# Patient Record
Sex: MALE | Race: WHITE
[De-identification: names, ages, dates, MRNs, and addresses within clinical notes are randomized per-mention and may not be internally consistent; named-entity substitution may affect disease eponyms.]

---

## 2019-09-16 ENCOUNTER — HOSPITAL ENCOUNTER (OUTPATIENT)
Dept: HOSPITAL 4 - SLB | Age: 69
Discharge: HOME | End: 2019-09-16
Payer: COMMERCIAL

## 2019-09-16 DIAGNOSIS — Z00.00: Primary | ICD-10-CM

## 2019-09-27 ENCOUNTER — HOSPITAL ENCOUNTER (INPATIENT)
Dept: HOSPITAL 36 - GERO | Age: 69
LOS: 19 days | Discharge: NURSING, REHAB OR LONG-TERM CARE | DRG: 885 | End: 2019-10-16
Attending: PSYCHIATRY & NEUROLOGY | Admitting: PSYCHIATRY & NEUROLOGY
Payer: MEDICARE

## 2019-09-27 VITALS — SYSTOLIC BLOOD PRESSURE: 138 MMHG | DIASTOLIC BLOOD PRESSURE: 57 MMHG

## 2019-09-27 DIAGNOSIS — F03.91: ICD-10-CM

## 2019-09-27 DIAGNOSIS — G47.00: ICD-10-CM

## 2019-09-27 DIAGNOSIS — Z66: ICD-10-CM

## 2019-09-27 DIAGNOSIS — Z86.73: ICD-10-CM

## 2019-09-27 DIAGNOSIS — F23: ICD-10-CM

## 2019-09-27 DIAGNOSIS — E78.5: ICD-10-CM

## 2019-09-27 DIAGNOSIS — F41.9: ICD-10-CM

## 2019-09-27 DIAGNOSIS — I25.10: ICD-10-CM

## 2019-09-27 DIAGNOSIS — F25.9: Primary | ICD-10-CM

## 2019-09-27 PROCEDURE — Z7610: HCPCS

## 2019-09-27 NOTE — HISTORY AND PHYSICAL
History of Present Illness





- HPI


Chief Complaint: 





68 y/o male patient was brought into ER on a 5150 Hold.


HPI: 





68 y/o male patient was brought into [facility] on a 5150 Hold.


Patient has history of TIA, CAD, Hyperlipidemia, Shizoaffective disorder, 

Bipolar disorder and Dementia.


Patient had an ER assessment and a complete workup was done. Patient was 

diagnosed with Dementia with behavioral disturbances, Schizoaffective disorder, 

H/o of CAD, H/o Stroke and H/o Hyperlipidemia.     


Patient will have a Psych evaluation. I will follow, treat and monitor patient.


Patient will continue current treatment plan a s ordered.


                                              








Vital Signs: 





 Last Vital Signs











Temp  96.8 F   09/27/19 15:31


 


Pulse  68   09/27/19 15:31


 


Resp  19   09/27/19 15:31


 


BP  138/57   09/27/19 15:31


 


Pulse Ox  96   09/27/19 15:31














Past Medical History


Cardiovascular: Report: CAD


Pulmonary: Report: No Pertinent Hx, Pulmonary Embolus


CNS: Report: Dementia


GI: Report: No Pertinent Hx


Psych: Report: Other (Schizoaffective disorder.)


Musculoskeletal: Report: No Pertinent Hx


Rheumatologic: Report: No pertinent Hx


Infectious Disease: Report: No Pertinent Hx


Renal/: Report: No Pertinent Hx





Family Medical History





- Family Member


  ** Son


History Unknown: Yes





Social History


Smoke: No


Alcohol: None


Drugs: None


Lives: Nursing Home


Domestic Violence: Negative





Health Maintenance


Health Maintenance: Other (Please see chart.)





- Medications


Home Medications: 


Home Medication











 Medication  Instructions  Recorded  Type


 


Acetaminophen [Tylenol] 650 mg PO Q4H PRN  tab 10/10/19 Rx


 


Al Hyd/Mg Hyd/Simethicone [Maalox] 30 ml PO Q4HR PRN  udc 10/10/19 Rx


 


Aspirin 325 mg PO DAILY  tab 10/10/19 Rx


 


Atorvastatin Calcium [Lipitor] 80 mg PO HS  tab 10/10/19 Rx


 


Clopidogrel [Plavix] 75 mg PO DAILY  tab 10/10/19 Rx


 


Divalproex Sprinkle [Depakote 250 mg PO DAILY  ecc 10/10/19 Rx





Sprinkle]   


 


Divalproex Sprinkle [Depakote 500 mg PO HS  ecc 10/10/19 Rx





Sprinkle]   


 


Docusate Sodium [Colace] 100 mg PO BID  cap 10/10/19 Rx


 


Lorazepam [Ativan] 0.5 mg PO Q4H PRN  tab 10/10/19 Rx


 


Magnesium Hydroxide [Milk of 30 ml PO HS PRN  udc 10/10/19 Rx





Magnesia]   


 


Menthol/Zinc Oxide [Calmoseptine] 1 appl TP QID PRN  appl 10/10/19 Rx


 


QUEtiapine Fumarate [SEROquel] 50 mg PO BID  tab 10/10/19 Rx


 


QUEtiapine Fumarate [SEROquel] 150 mg PO HS  tab 10/10/19 Rx


 


QUEtiapine Fumarate [SEROquel] 150 mg PO HS  tab 10/10/19 Rx


 


Zolpidem Tartrate [Ambien] 5 mg PO HS PRN  tab 10/10/19 Rx


 


Acetaminophen [Tylenol] 650 mg PO Q4H PRN  tab 10/16/19 Rx


 


Al Hyd/Mg Hyd/Simethicone [Maalox] 30 ml PO Q4HR PRN  udc 10/16/19 Rx


 


Aspirin 325 mg PO DAILY  tab 10/16/19 Rx


 


Atorvastatin Calcium [Lipitor] 80 mg PO DAILY  tab 10/16/19 Rx


 


Clopidogrel [Plavix] 75 mg PO DAILY  tab 10/16/19 Rx


 


Divalproex Sprinkle [Depakote 250 mg PO DAILY  ecc 10/16/19 Rx





Sprinkle]   


 


Divalproex Sprinkle [Depakote 500 mg PO HS  ecc 10/16/19 Rx





Sprinkle]   


 


Docusate Sodium [Colace] 100 mg PO BID  cap 10/16/19 Rx


 


Lorazepam [Ativan] 0.5 mg PO Q4HR PRN  tab 10/16/19 Rx


 


Magnesium Hydroxide [Milk of 30 ml PO HS PRN  udc 10/16/19 Rx





Magnesia]   


 


Menthol/Zinc Oxide [Calmoseptine] 1 appl TP QID PRN  appl 10/16/19 Rx


 


QUEtiapine Fumarate [SEROquel] 50 mg PO BID  tab 10/16/19 Rx


 


QUEtiapine Fumarate [SEROquel] 200 mg PO HS  tab 10/16/19 Rx


 


Zolpidem Tartrate [Ambien] 5 mg PO HS PRN  tab 10/16/19 Rx











Other Medications: 





Please see medication reconciliation sheet.





- Allergies


Allergies/Adverse Reactions: 


 Allergies











Allergy/AdvReac Type Severity Reaction Status Date / Time


 


No Known Allergies Allergy   Verified 08/31/19 17:24














Review of Systems





- Review of Systems


Review of Systems: 





patient is on 5150 hold due to hostile behavior towards staff.


Constitutional: Report: No Significant


Eyes: Report: No Significant


ENT: Report: No Significant


Respiratory: Report: No Significant


Cardiovascular: Report: No Significant


Gastrointestinal: Report: No Significant


Genitourinary: Report: No Significant


Musculoskeletal: Report: No Significant


Skin: Report: No Significant


Neurological: Report: Confusion





Physical Exam





- Physical Exam


HEENT: Report: Ears Nose Throat within normal limits


Neck: Report: Within normal limits


Cardiovascular Systems: Report: +s1/s2 noted


Respiratory: Report: Breath Sounds are within normal limits


Abdomen: Report: Non-tender to palpation


Back: Report: Inspection of back is within normal limits.


Extremities: Report: Non-tender to palpation.


Skin: Report: Color of skin is within normal limits


Neuro/Psych: Report: Disoriented to name time or place





- Lab Results


All Lab Results last 24 hours: 





please see orders.





- Assessment


Assessment: 


Dementia with behavioral disturbances.


Schizoaffective disorder.


History of Stroke.


History of Hyperlipidemia.   


History of TIA. 


History of CAD.








- Plan


Plan: 





Continuation of care.


Monitor vitals and labs.


Continue present meds as directed.


Monitor diet/nutritional support.


Supportive care.


Psych management per Psych.


Continue current treatment plan as ordered.

## 2019-09-29 NOTE — PROGRESS NOTES
DATE:  09/29/2019



SUBJECTIVE:  The patient was seen and evaluated.  The patient's chart reviewed.



Today on face-to-face evaluation, the patient is in Elizabeth chair.  He is extremely

uncomfortable, moving, restless, needs a lot of redirection to minimize to

behavior.  He is to have difficulty verbalizing what he is experiencing further

review of the chart.  I reviewed that the patient has a history of buttock

sacral ulcer.  I spoke with the patient's nurse the importance of followup today

with the medical evaluation to further assess the patient's sacral buttocks

ulcer as this make me contributing to the patient's behavior and irritability.



ASSESSMENT AND PLAN:  The patient has history of schizoaffective disorder,

dementia, was also medications from the nursing home reviewed.  The patient has

had been on 2 different antipsychotics which is unclear and recently had been

re-titrated to 75 mg p.o. b.i.d. of the Seroquel.  We will continue with the

current medication regimen and awaiting medical evaluation to continue further

assessing the patient's sacral ulcer.



Vitals, pulse and temperature within normal limits.





DD: 09/29/2019 07:21

DT: 09/29/2019 18:52

JOB# [id number]  [id number]

## 2019-09-29 NOTE — CONSULTATION
DATE OF CONSULTATION:  09/28/2019



The patient was seen and evaluated.  The patient's chart reviewed.



The patient was brought in here on a 5150.  He was selectively mute,

disoriented, needing redirection and easily agitated.



Today, on face-to-face evaluation, the patient is in the Elizabeth chair.  He is

observed to be responding, making hand gestures in regards to the voices, easily

irritable, agitated, needing a lot of redirection.



ALLERGIES TO MEDICATIONS:  NKDA.



PREVIOUS DIAGNOSES:  TIA, CAD, hyperlipidemia.



PREVIOUS PSYCHIATRIC HISTORY:  History of schizoaffective disorder, bipolar

type; dementia.



MEDICAL:  Please see H and P.



ESTIMATED LENGTH OF STAY:  Between 5-10 days.



MENTAL STATUS EXAMINATION:  Older age appearing, Elizabeth chair, disengaged,

confused, disorganized, and at times talking to himself.



SOCIAL HISTORY:  Not answering questions about social history.



FAMILY PSYCHIATRIC HISTORY:  Unclear.



MEDICATIONS:  Per records from [facility], medications reviewed, include

acetaminophen, aspirin, ____, Coreg, Depakote, Ativan as needed, Maalox,

Seroquel 100 mg a day, 200 at nighttime.



PROVISIONAL DIAGNOSTIC IMPRESSION:



PRIMARY DIAGNOSES:  Dementia with behavior disturbances, schizoaffective

disorder.



MEDICAL DIAGNOSIS:  As noted above.



PLAN:

1.  Admit the patient.

2.  I will obtain more collateral baseline information.

3.  Continue to monitor, supervise.

4.  Further collaborate on the patient's medication regimen reconciliation from

the reports, the patient's Seroquel 50 mg p.o. b.i.d.  There is an additional 25

mg, a total of 75 mg p.o. b.i.d. of the Seroquel, Depakote, ____, total Depakote

levels pending.  We will continue monitoring and evaluating.





DD: 09/28/2019 11:05

DT: 09/28/2019 20:27

JOB# [id number]  [id number]

## 2019-09-29 NOTE — INTERNAL MEDICINE PROG NOTE
Internal Medicine Subjective





- Subjective


Patient seen and examined:: with staff


Patient is:: awake, garfield chair, agitated, confused


Per staff patient has:: no adverse event, no episodes of fall, agitated, 

confused





Internal Medicine Objective





- Physical Exam


Vitals and I&O: 


 Vital Signs











Temp  97.8 F   09/29/19 06:01


 


Pulse  83   09/29/19 06:01


 


Resp  18   09/29/19 06:01


 


BP  115/57   09/29/19 06:01


 


Pulse Ox  96   09/29/19 06:01








 Intake & Output











 09/28/19 09/29/19 09/29/19





 18:59 06:59 18:59


 


Intake Total 1200 480 


 


Output Total  2 


 


Balance 1200 478 


 


Intake:   


 


  Oral 1200 480 


 


Output:   


 


  Urine/Stool Mix  2 


 


Other:   


 


  # Voids  1 


 


  # Bowel Movements 1 1 











Active Medications: 


Current Medications





Acetaminophen (Tylenol)  650 mg PO Q4H PRN


   PRN Reason: Mild Pain/Headache/T above 101


   Stop: 11/26/19 14:21


Al Hydrox/Mg Hydrox/Simethicone (Maalox)  30 ml PO Q4HR PRN


   PRN Reason: FPR HEARTBURN


   Stop: 11/27/19 09:09


Aspirin (Aspirin)  325 mg PO DAILY [facility]


   Stop: 11/27/19 08:59


   Last Admin: 09/29/19 08:44 Dose:  325 mg


Atorvastatin Calcium (Lipitor)  80 mg PO HS [facility]; Protocol


   Stop: 11/27/19 20:59


   Last Admin: 09/28/19 21:20 Dose:  80 mg


Clopidogrel Bisulfate (Plavix)  75 mg PO DAILY [facility]


   Stop: 11/27/19 08:59


   Last Admin: 09/29/19 08:44 Dose:  75 mg


Divalproex Sodium (Depakote Er)  250 mg PO HS [facility]; Protocol


   Stop: 11/27/19 20:59


   Last Admin: 09/28/19 21:20 Dose:  250 mg


Divalproex Sodium (Depakote Er)  250 mg PO QPM [facility]; Protocol


   Stop: 11/27/19 16:59


   Last Admin: 09/28/19 18:14 Dose:  250 mg


Docusate Sodium (Colace)  100 mg PO BID [facility]


   Stop: 11/27/19 16:59


   Last Admin: 09/29/19 08:44 Dose:  100 mg


Lorazepam (Ativan)  0.5 mg PO Q4H PRN; Protocol


   PRN Reason: Anxiety/Agitation


   Stop: 11/26/19 14:21


   Last Admin: 09/29/19 08:44 Dose:  0.5 mg


Magnesium Hydroxide (Milk Of Magnesia)  30 ml PO HS PRN


   PRN Reason: Constipation


   Stop: 11/26/19 14:21


Quetiapine Fumarate (Seroquel)  75 mg PO BID [facility]; Protocol


   Stop: 11/28/19 08:59


   Last Admin: 09/29/19 08:44 Dose:  75 mg


Zolpidem Tartrate (Ambien)  5 mg PO HS PRN


   PRN Reason: Insomnia


   Stop: 11/26/19 14:21


   Last Admin: 09/28/19 21:24 Dose:  5 mg








General: weak, alert, disheveled


HEENT: NC/AT


Neck: Supple, No JVD


Lungs: other (no acute respiratory distress)


Cardiovascular: RRR


Abdomen: soft, non-tender, non-distended


Extremities: ecchymosis


Neurological: unable to follow command (confused, demented)





Internal Medicine Assmt/Plan





- Assessment


Assessment: 





Dementia


Schizoaffective disorder


Hx CAD


Hx Stoke


Hyperlipidemia





- Plan


Plan: 





Continue current treatment plan.


Monitor Labs.Continue current medications


Continue to monitor VS


Monitor Diet/Nutritional support.


Psych management per Psychiatry.


Pain Management.


Wound Care consult - pending - ordered yesterday


PT/OT prn


Safety precaution, Fall precaution, frequent nursing round.


Supportive care. 


Continue collaborating with consulting specialists, case management and nursing 

team.

## 2019-09-30 NOTE — INTERNAL MEDICINE PROG NOTE
Internal Medicine Subjective





- Subjective


Service Date: 09/30/19


Patient seen and examined:: with staff


Patient is:: awake, garfield chair, agitated, confused


Patient Complaints of:: other (Anxiety.)


Per staff patient has:: no adverse event, no episodes of fall, agitated, 

confused





Internal Medicine Objective





- Physical Exam


Vitals and I&O: 


 Vital Signs











Temp  97.9 F   09/30/19 06:00


 


Pulse  81   09/30/19 06:00


 


Resp  19   09/30/19 06:00


 


BP  136/80   09/30/19 06:00


 


Pulse Ox  95   09/30/19 06:00








 Intake & Output











 09/29/19 09/30/19 09/30/19





 18:59 06:59 18:59


 


Intake Total 850 180 


 


Output Total  1 


 


Balance 850 179 


 


Intake:   


 


  Oral 850 180 


 


Output:   


 


  Urine/Stool Mix  1 


 


Other:   


 


  # Voids 3 1 


 


  # Bowel Movements 1 1 











Active Medications: 


Current Medications





Acetaminophen (Tylenol)  650 mg PO Q4H PRN


   PRN Reason: Mild Pain/Headache/T above 101


   Stop: 11/26/19 14:21


Al Hydrox/Mg Hydrox/Simethicone (Maalox)  30 ml PO Q4HR PRN


   PRN Reason: FPR HEARTBURN


   Stop: 11/27/19 09:09


Aspirin (Aspirin)  325 mg PO DAILY [facility]


   Stop: 11/27/19 08:59


   Last Admin: 09/29/19 08:44 Dose:  325 mg


Atorvastatin Calcium (Lipitor)  80 mg PO HS [facility]; Protocol


   Stop: 11/27/19 20:59


   Last Admin: 09/29/19 21:57 Dose:  80 mg


Clopidogrel Bisulfate (Plavix)  75 mg PO DAILY [facility]


   Stop: 11/27/19 08:59


   Last Admin: 09/29/19 08:44 Dose:  75 mg


Divalproex Sodium (Depakote Er)  250 mg PO HS [facility]; Protocol


   Stop: 11/27/19 20:59


   Last Admin: 09/29/19 21:57 Dose:  250 mg


Divalproex Sodium (Depakote Er)  250 mg PO QPM [facility]; Protocol


   Stop: 11/27/19 16:59


   Last Admin: 09/29/19 16:33 Dose:  250 mg


Docusate Sodium (Colace)  100 mg PO BID [facility]


   Stop: 11/27/19 16:59


   Last Admin: 09/29/19 16:32 Dose:  100 mg


Lorazepam (Ativan)  0.5 mg PO Q4H PRN; Protocol


   PRN Reason: Anxiety/Agitation


   Stop: 11/26/19 14:21


   Last Admin: 09/29/19 08:44 Dose:  0.5 mg


Magnesium Hydroxide (Milk Of Magnesia)  30 ml PO HS PRN


   PRN Reason: Constipation


   Stop: 11/26/19 14:21


Quetiapine Fumarate (Seroquel)  75 mg PO BID [facility]; Protocol


   Stop: 11/28/19 08:59


   Last Admin: 09/29/19 16:32 Dose:  75 mg


Zolpidem Tartrate (Ambien)  5 mg PO HS PRN


   PRN Reason: Insomnia


   Stop: 11/26/19 14:21


   Last Admin: 09/28/19 21:24 Dose:  5 mg








General: weak, alert, disheveled


HEENT: NC/AT


Neck: Supple, No JVD


Lungs: other (no acute respiratory distress)


Cardiovascular: RRR


Abdomen: soft, non-tender, non-distended


Extremities: ecchymosis


Neurological: no change, unable to follow command (confused, demented)





Internal Medicine Assmt/Plan





- Assessment


Assessment: 





Dementia.


Schizoaffective disorder.


H/o CAD.


H/o Stroke.


H/o Hyperlipidemia.





- Plan


Plan: 





Continuation of care.


Monitor labs and vitals.


Monitor diet/nutritional support.


Psych management per Psych.


Continue present meds as directed.


Continue current treatment plan as ordered.





Nutritional Asmnt/Malnutr-PDOC





- Dietary Evaluation


Malnutrition Findings (Please click <Entered> for more info): 





see orders

## 2019-09-30 NOTE — PROGRESS NOTES
DATE:  09/30/2019



SUBJECTIVE:  The patient in the hospital.  Currently in a Elizabeth chair, very

confused, disoriented, not really answering any questions, difficult interview. 

Noted to be restless, needing a lot of redirection, history of schizoaffective,

ulcers as well.  Medication adjustments have been made.  Very confused, AO to

essentially 1 only, preoccupied, irritable, disorganized.  Medications were

noted.  We will continue to monitor.





DD: 09/30/2019 11:45

DT: 09/30/2019 23:57

JOB# [id number]  [id number]

## 2019-10-01 ENCOUNTER — HOSPITAL ENCOUNTER (OUTPATIENT)
Dept: HOSPITAL 4 - SLB | Age: 69
Discharge: HOME | End: 2019-10-01
Payer: COMMERCIAL

## 2019-10-01 DIAGNOSIS — Z00.00: Primary | ICD-10-CM

## 2019-10-01 NOTE — INTERNAL MEDICINE PROG NOTE
Internal Medicine Subjective





- Subjective


Service Date: 10/01/19


Patient is:: awake, garfield chair, agitated, confused


Patient Complaints of:: other (Anxiety.)


Per staff patient has:: no adverse event, no episodes of fall, agitated, 

confused





Internal Medicine Objective





- Physical Exam


Vitals and I&O: 


 Vital Signs











Temp  97.7 F   10/01/19 14:00


 


Pulse  75   10/01/19 14:00


 


Resp  18   10/01/19 14:00


 


BP  115/67   10/01/19 14:00


 


Pulse Ox  97   10/01/19 14:00








 Intake & Output











 09/30/19 10/01/19 10/01/19





 18:59 06:59 18:59


 


Intake Total 800 120 


 


Output Total  1 


 


Balance 800 119 


 


Intake:   


 


  Oral 800 120 


 


Output:   


 


  Urine/Stool Mix  1 


 


Other:   


 


  # Voids 3 1 


 


  # Bowel Movements 2 1 


 


  Stool Characteristics Soft  





 Formed  





 Brown  











Active Medications: 


Current Medications





Acetaminophen (Tylenol)  650 mg PO Q4H PRN


   PRN Reason: Mild Pain/Headache/T above 101


   Stop: 11/26/19 14:21


Al Hydrox/Mg Hydrox/Simethicone (Maalox)  30 ml PO Q4HR PRN


   PRN Reason: FPR HEARTBURN


   Stop: 11/27/19 09:09


Aspirin (Aspirin)  325 mg PO DAILY Counts include 234 beds at the [facility]


   Stop: 11/27/19 08:59


   Last Admin: 10/01/19 09:23 Dose:  Not Given


Atorvastatin Calcium (Lipitor)  80 mg PO HS Counts include 234 beds at the [facility]; Protocol


   Stop: 11/27/19 20:59


   Last Admin: 09/30/19 22:00 Dose:  80 mg


Clopidogrel Bisulfate (Plavix)  75 mg PO DAILY Counts include 234 beds at the [facility]


   Stop: 11/27/19 08:59


   Last Admin: 10/01/19 09:23 Dose:  Not Given


Divalproex Sodium (Depakote Er)  250 mg PO HS Counts include 234 beds at the [facility]; Protocol


   Stop: 11/27/19 20:59


   Last Admin: 09/30/19 22:00 Dose:  250 mg


Divalproex Sodium (Depakote Er)  250 mg PO QPM Counts include 234 beds at the [facility]; Protocol


   Stop: 11/27/19 16:59


   Last Admin: 09/30/19 17:50 Dose:  250 mg


Docusate Sodium (Colace)  100 mg PO BID Counts include 234 beds at the [facility]


   Stop: 11/27/19 16:59


   Last Admin: 10/01/19 09:23 Dose:  Not Given


Lorazepam (Ativan)  0.5 mg PO Q4H PRN; Protocol


   PRN Reason: Anxiety/Agitation


   Stop: 11/26/19 14:21


   Last Admin: 10/01/19 13:55 Dose:  0.5 mg


Magnesium Hydroxide (Milk Of Magnesia)  30 ml PO HS PRN


   PRN Reason: Constipation


   Stop: 11/26/19 14:21


Quetiapine Fumarate 25 mg/ (Quetiapine Fumarate 50 mg)  75 mg PO BID [facility]


   Stop: 11/30/19 08:59


   Last Admin: 10/01/19 09:23 Dose:  Not Given


Zolpidem Tartrate (Ambien)  5 mg PO HS PRN


   PRN Reason: Insomnia


   Stop: 11/26/19 14:21


   Last Admin: 09/28/19 21:24 Dose:  5 mg








General: weak, alert, disheveled


HEENT: NC/AT


Neck: Supple, No JVD


Lungs: other (no acute respiratory distress)


Cardiovascular: RRR


Abdomen: soft, non-tender, non-distended


Extremities: ecchymosis


Neurological: no change, unable to follow command (confused, demented)





Internal Medicine Assmt/Plan





- Assessment


Assessment: 





Dementia.


Schizoaffective disorder.


H/o CAD.


H/o Stroke.


H/o Hyperlipidemia.








- Plan


Plan: 








Continuation of care.


Monitor labs and vitals.


Monitor diet/nutritional support.


Psych management per Psych.


Continue present meds as directed.


Continue current treatment plan as ordered.

## 2019-10-02 ENCOUNTER — HOSPITAL ENCOUNTER (OUTPATIENT)
Dept: HOSPITAL 4 - SLB | Age: 69
Discharge: HOME | End: 2019-10-02
Payer: COMMERCIAL

## 2019-10-02 DIAGNOSIS — Z00.00: Primary | ICD-10-CM

## 2019-10-02 LAB
ALBUMIN SERPL BCP-MCNC: 2.9 G/DL (ref 3.4–4.8)
ALT SERPL W P-5'-P-CCNC: 75 U/L (ref 12–78)
ANION GAP SERPL CALCULATED.3IONS-SCNC: 5 MMOL/L (ref 5–15)
AST SERPL W P-5'-P-CCNC: 49 U/L (ref 10–37)
BILIRUB SERPL-MCNC: 0.9 MG/DL (ref 0–1)
BUN SERPL-MCNC: 16 MG/DL (ref 8–21)
CALCIUM SERPL-MCNC: 8.4 MG/DL (ref 8.4–11)
CHLORIDE SERPL-SCNC: 103 MMOL/L (ref 98–107)
CREAT SERPL-MCNC: 0.94 MG/DL (ref 0.55–1.3)
GFR SERPL CREATININE-BSD FRML MDRD: 103 ML/MIN (ref 90–?)
GLUCOSE SERPL-MCNC: 101 MG/DL (ref 70–99)
POTASSIUM SERPL-SCNC: 3.7 MMOL/L (ref 3.5–5.1)
SODIUM SERPLBLD-SCNC: 138 MMOL/L (ref 136–145)

## 2019-10-02 NOTE — INTERNAL MEDICINE PROG NOTE
Internal Medicine Subjective





- Subjective


Service Date: 10/02/19


Patient seen and examined:: with staff


Patient is:: awake, garfield chair, agitated, confused


Patient Complaints of:: other (Anxiety.)


Per staff patient has:: no adverse event, no episodes of fall, agitated, 

confused





Internal Medicine Objective





- Physical Exam


Vitals and I&O: 


 Vital Signs











Temp  97.6 F   10/02/19 05:21


 


Pulse  60   10/02/19 05:21


 


Resp  19   10/02/19 05:21


 


BP  109/62   10/02/19 05:21


 


Pulse Ox  97   10/02/19 05:21








 Intake & Output











 10/01/19 10/02/19 10/02/19





 18:59 06:59 18:59


 


Intake Total 700 180 


 


Balance 700 180 


 


Intake:   


 


  Oral 700 180 


 


Other:   


 


  # Voids 3 1 


 


  # Bowel Movements 0 0 











Active Medications: 


Current Medications





Acetaminophen (Tylenol)  650 mg PO Q4H PRN


   PRN Reason: Mild Pain/Headache/T above 101


   Stop: 11/26/19 14:21


Al Hydrox/Mg Hydrox/Simethicone (Maalox)  30 ml PO Q4HR PRN


   PRN Reason: FPR HEARTBURN


   Stop: 11/27/19 09:09


Aspirin (Aspirin)  325 mg PO DAILY [facility]


   Stop: 11/27/19 08:59


   Last Admin: 10/02/19 08:17 Dose:  325 mg


Atorvastatin Calcium (Lipitor)  80 mg PO HS [facility]; Protocol


   Stop: 11/27/19 20:59


   Last Admin: 10/01/19 21:34 Dose:  80 mg


Clopidogrel Bisulfate (Plavix)  75 mg PO DAILY [facility]


   Stop: 11/27/19 08:59


   Last Admin: 10/02/19 08:17 Dose:  75 mg


Divalproex Sodium (Depakote Sprinkle)  250 mg PO HS [facility]; Protocol


   Stop: 12/01/19 20:59


Divalproex Sodium (Depakote Sprinkle)  250 mg PO QPM [facility]; Protocol


   Stop: 12/01/19 16:59


Docusate Sodium (Colace)  100 mg PO BID [facility]


   Stop: 11/27/19 16:59


   Last Admin: 10/02/19 08:17 Dose:  100 mg


Lorazepam (Ativan)  0.5 mg PO Q4H PRN; Protocol


   PRN Reason: Anxiety/Agitation


   Stop: 11/26/19 14:21


   Last Admin: 10/01/19 13:55 Dose:  0.5 mg


Magnesium Hydroxide (Milk Of Magnesia)  30 ml PO HS PRN


   PRN Reason: Constipation


   Stop: 11/26/19 14:21


Quetiapine Fumarate 25 mg/ (Quetiapine Fumarate 50 mg)  75 mg PO BID [facility]


   Stop: 11/30/19 08:59


   Last Admin: 10/02/19 08:17 Dose:  75 mg


Zolpidem Tartrate (Ambien)  5 mg PO HS PRN


   PRN Reason: Insomnia


   Stop: 11/26/19 14:21


   Last Admin: 09/28/19 21:24 Dose:  5 mg








Physical Exam: 





Patient is easily agitated and very confused.


General: weak, alert, disheveled


HEENT: NC/AT


Neck: Supple, No JVD


Lungs: other (no acute respiratory distress)


Cardiovascular: RRR


Abdomen: soft, non-tender, non-distended


Extremities: ecchymosis


Neurological: no change, unable to follow command (confused, demented)





Internal Medicine Assmt/Plan





- Assessment


Assessment: 


Dementia.


Schizoaffective disorder.


H/o CAD.


H/o Stroke.


H/o Hyperlipidemia.





- Plan


Plan: 


Continuation of care.


Monitor labs and vitals.


Monitor diet/nutritional support.


Psych management per Psych.


Continue present meds as directed.


Continue current treatment plan as ordered.





Nutritional Asmnt/Malnutr-PDOC





- Dietary Evaluation


Malnutrition Findings (Please click <Entered> for more info): 





see orders.

## 2019-10-02 NOTE — PROGRESS NOTES
DATE:  10/01/2019



SUBJECTIVE:  The patient is still confused and disoriented.  The patient also is

still having difficulty with his mood and difficulty with expressing himself. 

The patient also is still restless and has mood swings.  Otherwise, the patient

is compliant with medications with no side effects.



ASSESSMENT:  The patient is still confused and irritable with mood swings.



TREATMENT PLAN:  We will continue monitoring his behavior closely.  Also, we

will continue Seroquel and Depakote and we will get Depakote blood level today

and continue to follow up.





DD: 10/01/2019 18:58

DT: 10/02/2019 16:37

JOB# [id number]  [id number]

## 2019-10-02 NOTE — INTERNAL MEDICINE PROG NOTE
Internal Medicine Subjective





- Subjective


Service Date: 10/02/19


Patient seen and examined:: with staff


Patient is:: awake, garfield chair, agitated, confused


Patient Complaints of:: other (Anxiety.)


Per staff patient has:: no adverse event, no episodes of fall, agitated, 

confused





Internal Medicine Objective





- Physical Exam


Vitals and I&O: 


 Vital Signs











Temp  97.6 F   10/02/19 05:21


 


Pulse  60   10/02/19 05:21


 


Resp  19   10/02/19 05:21


 


BP  109/62   10/02/19 05:21


 


Pulse Ox  97   10/02/19 05:21








 Intake & Output











 10/01/19 10/02/19 10/02/19





 18:59 06:59 18:59


 


Intake Total 700 180 


 


Balance 700 180 


 


Intake:   


 


  Oral 700 180 


 


Other:   


 


  # Voids 3 1 


 


  # Bowel Movements 0 0 











Active Medications: 


Current Medications





Acetaminophen (Tylenol)  650 mg PO Q4H PRN


   PRN Reason: Mild Pain/Headache/T above 101


   Stop: 11/26/19 14:21


Al Hydrox/Mg Hydrox/Simethicone (Maalox)  30 ml PO Q4HR PRN


   PRN Reason: FPR HEARTBURN


   Stop: 11/27/19 09:09


Aspirin (Aspirin)  325 mg PO DAILY [facility]


   Stop: 11/27/19 08:59


   Last Admin: 10/02/19 08:17 Dose:  325 mg


Atorvastatin Calcium (Lipitor)  80 mg PO HS [facility]; Protocol


   Stop: 11/27/19 20:59


   Last Admin: 10/01/19 21:34 Dose:  80 mg


Clopidogrel Bisulfate (Plavix)  75 mg PO DAILY [facility]


   Stop: 11/27/19 08:59


   Last Admin: 10/02/19 08:17 Dose:  75 mg


Divalproex Sodium (Depakote Sprinkle)  250 mg PO HS [facility]; Protocol


   Stop: 12/01/19 20:59


Divalproex Sodium (Depakote Sprinkle)  250 mg PO QPM [facility]; Protocol


   Stop: 12/01/19 16:59


Docusate Sodium (Colace)  100 mg PO BID [facility]


   Stop: 11/27/19 16:59


   Last Admin: 10/02/19 08:17 Dose:  100 mg


Lorazepam (Ativan)  0.5 mg PO Q4H PRN; Protocol


   PRN Reason: Anxiety/Agitation


   Stop: 11/26/19 14:21


   Last Admin: 10/01/19 13:55 Dose:  0.5 mg


Magnesium Hydroxide (Milk Of Magnesia)  30 ml PO HS PRN


   PRN Reason: Constipation


   Stop: 11/26/19 14:21


Quetiapine Fumarate 25 mg/ (Quetiapine Fumarate 50 mg)  75 mg PO BID [facility]


   Stop: 11/30/19 08:59


   Last Admin: 10/02/19 08:17 Dose:  75 mg


Zolpidem Tartrate (Ambien)  5 mg PO HS PRN


   PRN Reason: Insomnia


   Stop: 11/26/19 14:21


   Last Admin: 09/28/19 21:24 Dose:  5 mg








Physical Exam: 


Patient is still very confused and in irritable mood.


General: weak, alert, disheveled


HEENT: NC/AT


Neck: Supple, No JVD


Lungs: other (no acute respiratory distress)


Cardiovascular: RRR


Abdomen: soft, non-tender, non-distended


Extremities: ecchymosis


Neurological: no change, unable to follow command (confused, demented)





Internal Medicine Assmt/Plan





- Assessment


Assessment: 





Dementia.


Schizoaffective disorder.


H/o CAD.


H/o Stroke.


H/o Hyperlipidemia.





- Plan


Plan: 





Continuation of care.


Monitor labs and vitals.


Monitor diet/nutritional support.


Psych management per Psych.


Continue present meds as directed.


Continue current treatment plan as ordered.





Nutritional Asmnt/Malnutr-PDOC





- Dietary Evaluation


Malnutrition Findings (Please click <Entered> for more info): 





see orders.

## 2019-10-03 NOTE — INTERNAL MEDICINE PROG NOTE
Internal Medicine Subjective





- Subjective


Service Date: 10/03/19


Patient is:: awake, garfield chair, agitated, confused


Patient Complaints of:: other (Anxiety.)


Per staff patient has:: no adverse event, no episodes of fall, agitated, 

confused





Internal Medicine Objective





- Physical Exam


Vitals and I&O: 


 Vital Signs











Temp  97.2 F   10/02/19 20:45


 


Pulse  74   10/02/19 20:45


 


Resp  20   10/02/19 20:45


 


BP  102/70   10/02/19 20:45


 


Pulse Ox  97   10/02/19 20:45








 Intake & Output











 10/02/19 10/03/19 10/03/19





 18:59 06:59 18:59


 


Intake Total 800 120 


 


Balance 800 120 


 


Intake:   


 


  Oral 800 120 


 


Other:   


 


  # Voids 3 3 


 


  # Bowel Movements 1  











Active Medications: 


Current Medications





Acetaminophen (Tylenol)  650 mg PO Q4H PRN


   PRN Reason: Mild Pain/Headache/T above 101


   Stop: 11/26/19 14:21


   Last Admin: 10/02/19 20:17 Dose:  650 mg


Al Hydrox/Mg Hydrox/Simethicone (Maalox)  30 ml PO Q4HR PRN


   PRN Reason: FPR HEARTBURN


   Stop: 11/27/19 09:09


Aspirin (Aspirin)  325 mg PO DAILY [facility]


   Stop: 11/27/19 08:59


   Last Admin: 10/03/19 08:13 Dose:  325 mg


Atorvastatin Calcium (Lipitor)  80 mg PO HS [facility]; Protocol


   Stop: 11/27/19 20:59


   Last Admin: 10/02/19 20:16 Dose:  80 mg


Calamine/Phenol (Calmoseptine)  1 appl TP QID PRN


   PRN Reason: Skin Irritation


   Stop: 12/01/19 17:06


Clopidogrel Bisulfate (Plavix)  75 mg PO DAILY [facility]


   Stop: 11/27/19 08:59


   Last Admin: 10/03/19 08:13 Dose:  75 mg


Divalproex Sodium (Depakote Sprinkle)  250 mg PO HS [facility]; Protocol


   Stop: 12/01/19 20:59


   Last Admin: 10/02/19 20:16 Dose:  250 mg


Divalproex Sodium (Depakote Sprinkle)  250 mg PO QPM [facility]; Protocol


   Stop: 12/01/19 16:59


   Last Admin: 10/02/19 16:33 Dose:  250 mg


Docusate Sodium (Colace)  100 mg PO BID [facility]


   Stop: 11/27/19 16:59


   Last Admin: 10/03/19 08:13 Dose:  100 mg


Lorazepam (Ativan)  0.5 mg PO Q4H PRN; Protocol


   PRN Reason: Anxiety/Agitation


   Stop: 11/26/19 14:21


   Last Admin: 10/03/19 08:13 Dose:  0.5 mg


Magnesium Hydroxide (Milk Of Magnesia)  30 ml PO HS PRN


   PRN Reason: Constipation


   Stop: 11/26/19 14:21


Quetiapine Fumarate 25 mg/ (Quetiapine Fumarate 50 mg)  75 mg PO BID [facility]


   Stop: 11/30/19 08:59


   Last Admin: 10/03/19 08:13 Dose:  75 mg


Zolpidem Tartrate (Ambien)  5 mg PO HS PRN


   PRN Reason: Insomnia


   Stop: 11/26/19 14:21


   Last Admin: 09/28/19 21:24 Dose:  5 mg








General: weak, alert, disheveled


HEENT: NC/AT


Neck: Supple, No JVD


Lungs: other (no acute respiratory distress)


Cardiovascular: RRR


Abdomen: soft, non-tender, non-distended


Extremities: ecchymosis


Neurological: no change, unable to follow command (confused, demented)





Internal Medicine Assmt/Plan





- Assessment


Assessment: 





Dementia.


Schizoaffective disorder.


H/o CAD.


H/o Stroke.


H/o Hyperlipidemia.








- Plan


Plan: 








Continuation of care.


Monitor labs and vitals.


Monitor diet/nutritional support.


Psych management per Psych.


Continue present meds as directed.


Continue current treatment plan as ordered.

## 2019-10-03 NOTE — PROGRESS NOTES
DATE:  10/02/2019



SUBJECTIVE:  Chart reviewed and the patient interviewed.  Also discussed the

patient's condition with the staff and reviewed records and labs.  The patient

continued to be confused and still has disorganized thoughts.  The patient also

is preoccupied and is easily irritable and easily agitated especially when

talking to him ____.  Also unable to carry on any coherent conversation ____,

otherwise the patient is continued to be compliant with taking his medication

with no side effects from the medications.



ASSESSMENT:  The patient is still confused and considered to be gravely

disabled.



TREATMENT PLAN:  Continue Seroquel and Depakote at same dose.  The patient has

been having difficulty swallowing Depakote extended release and changed it to

Depakote Sprinkles for easier to swallow.  Also, Depakote blood level was

ordered and results are pending.  Also, questionable if the patient can return

to previous placement.





DD: 10/02/2019 22:24

DT: 10/03/2019 15:44

JOB# [id number]  [id number]

## 2019-10-04 RX ADMIN — ANORECTAL OINTMENT PRN APPL: 15.7; .44; 24; 20.6 OINTMENT TOPICAL at 00:43

## 2019-10-04 NOTE — INTERNAL MEDICINE PROG NOTE
Internal Medicine Subjective





- Subjective


Service Date: 10/04/19


Patient seen and examined:: with staff


Patient is:: awake, garfield chair, agitated, confused


Patient Complaints of:: other (Anxiety.)


Per staff patient has:: no adverse event, no episodes of fall, agitated, 

confused





Internal Medicine Objective





- Physical Exam


Vitals and I&O: 


 Vital Signs











Temp  97.7 F   10/03/19 20:54


 


Pulse  98   10/03/19 20:54


 


Resp  20   10/03/19 20:54


 


BP  100/72   10/03/19 20:54


 


Pulse Ox  95   10/03/19 20:54








 Intake & Output











 10/03/19 10/04/19 10/04/19





 18:59 06:59 18:59


 


Intake Total 1300  


 


Balance 1300  


 


Intake:   


 


  Oral 1300  


 


Other:   


 


  # Voids 4  


 


  # Bowel Movements 0  











Active Medications: 


Current Medications





Acetaminophen (Tylenol)  650 mg PO Q4H PRN


   PRN Reason: Mild Pain/Headache/T above 101


   Stop: 19 14:21


   Last Admin: 10/02/19 20:17 Dose:  650 mg


Al Hydrox/Mg Hydrox/Simethicone (Maalox)  30 ml PO Q4HR PRN


   PRN Reason: FPR HEARTBURN


   Stop: 19 09:09


Aspirin (Aspirin)  325 mg PO DAILY [facility]


   Stop: 19 08:59


   Last Admin: 10/04/19 08:40 Dose:  325 mg


Atorvastatin Calcium (Lipitor)  80 mg PO HS [facility]; Protocol


   Stop: 19 20:59


   Last Admin: 10/03/19 20:04 Dose:  80 mg


Calamine/Phenol (Calmoseptine)  1 appl TP QID PRN


   PRN Reason: Skin Irritation


   Stop: 19 17:06


   Last Admin: 10/04/19 00:43 Dose:  1 appl


Clopidogrel Bisulfate (Plavix)  75 mg PO DAILY [facility]


   Stop: 19 08:59


   Last Admin: 10/04/19 08:39 Dose:  75 mg


Divalproex Sodium (Depakote Sprinkle)  250 mg PO HS [facility]; Protocol


   Stop: 19 20:59


   Last Admin: 10/03/19 20:04 Dose:  250 mg


Divalproex Sodium (Depakote Sprinkle)  250 mg PO QPM [facility]; Protocol


   Stop: 19 16:59


   Last Admin: 10/03/19 17:19 Dose:  250 mg


Docusate Sodium (Colace)  100 mg PO BID [facility]


   Stop: 19 16:59


   Last Admin: 10/04/19 08:40 Dose:  100 mg


Lorazepam (Ativan)  0.5 mg PO Q4H PRN; Protocol


   PRN Reason: Anxiety/Agitation


   Stop: 19 14:21


   Last Admin: 10/04/19 05:32 Dose:  0.5 mg


Magnesium Hydroxide (Milk Of Magnesia)  30 ml PO HS PRN


   PRN Reason: Constipation


   Stop: 19 14:21


Quetiapine Fumarate 25 mg/ (Quetiapine Fumarate 50 mg)  75 mg PO BID [facility]


   Stop: 19 08:59


   Last Admin: 10/04/19 08:40 Dose:  75 mg


Zolpidem Tartrate (Ambien)  5 mg PO HS PRN


   PRN Reason: Insomnia


   Stop: 19 14:21


   Last Admin: 10/03/19 20:05 Dose:  5 mg








Physical Exam: 


Patient is gravely disabled and remains very confused.


General: weak, alert, disheveled


HEENT: NC/AT


Neck: Supple, No JVD


Lungs: other (no acute respiratory distress)


Cardiovascular: RRR


Abdomen: soft, non-tender, non-distended


Extremities: ecchymosis


Neurological: no change, unable to follow command (confused, demented)





Internal Medicine Assmt/Plan





- Assessment


Assessment: 


Dementia.


Schizoaffective disorder.


H/o CAD.


H/o Stroke.


H/o Hyperlipidemia.





- Plan


Plan: 


Continuation of care.


Monitor labs and vitals.


Monitor diet/nutritional support.


Psych management per Psych.


Continue present meds as directed.


Continue current treatment plan as ordered.





Nutritional Asmnt/Malnutr-PDOC





- Dietary Evaluation


Malnutrition Findings (Please click <Entered> for more info): 








Nutritional Asmnt/Malnutrition                             Start:  10/03/19 15:

45


Text:                                                      Status: Complete    

  


Freq:                                                                          

  


Protocol:                                                                      

  


 Document     10/03/19 15:55  CARLIN  (Rec: 10/03/19 16:00  CARLIN [id number])


 Nutritional Asmnt/Malnutrition


     Patient General Information


      Nutritional Screening                      Low Risk


      Diagnosis                                  Psychosis


      Pertinent Medical Hx/Surgical Hx           TIA, CAD, Hyperlipidemia,


                                                 Schizoaffective Disorder,


                                                 Bipolar Type, Dementia


      Subjective Information                     Pt is a 69-year-old male


                                                 admitted on  on a 5150. Pt


                                                 is eating % of meals


                                                 Per Meal/Nutrition Activity


                                                 Record. Dietary is currently


                                                 providing an estimated 2370


                                                 kcals and 107gm Pro, per Pt


                                                 intake, this is providing an


                                                 estimated 2070 kcals and 94gm


                                                 Pro to meet 100% kcal and 100+


                                                 % Pro needs- adequate to meet


                                                 estimated nutritional needs.


                                                 Per wound care note (10/2), 


                                                 Right Lower Sacral and Buttock


                                                 area: Reopened scar tissue


                                                 from a wound of prior unknown


                                                 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


                                                 MASD. Open sites have 100% red


                                                 tissue. No odor, no drainage.


                                                 Yuli-wound intact.


                                                 Surrounding tissue has dark


                                                 discolored flaky skin and scar


                                                 tissue. Moisture barrier


                                                 cream nearly covered all of


                                                 the non-intact areas. Entire


                                                 site measures 13.0 cm x 15.0


                                                 cm (includes dark discolored


                                                 flaky skin, with a few


                                                 sporadic non-intact sites


                                                 within the dark discolored


                                                 flaky skin). Spoke with pt


                                                 Nurse, Colette, concerning wound


                                                 . Suggested either a


                                                 multivitamin or Prakash to


                                                 support wound healing, Colette


                                                 stated he would speak with MD


                                                 concerning a Multivitamin as


                                                 he did not believe the pt


                                                 would drink the Prakash.


                                                 HT: 57


                                                 WT: 155 LB (70.45 kg)


                                                 BMI: 24.28 (Normal)


                                                 GI: WNL, Soft, Non-tender


                                                 BM: 10/3 x1


                                                 I/O: 920/Not Noted


                                                 Skin: Area of Concern, ALIX Leg


                                                 abrasions with reddening, LT


                                                 hand bruise, Sacral/Buttock


                                                 open wound


                                                 Philip: 16


                                                 Diet Order: Pureed, ROBIN


                                                 Estimated Energy Needs: (


                                                 Geriatric, CBW)


                                                 9749-7132 kcals (25-30 kcals/


                                                 kg)


                                                 70-85g Pro (1.0-1.2 g/kg)


                                                 7111-8129 ml (25-30 ml/kg)


      Current Diet Order/ Nutrition Support      Pureed, ROBIN


      Pertinent Medications                      Maalox (PRN), Lipitor, Colace,


                                                 MOM (PRN)


      Pertinent Labs                             No Labs To Report.


     Nutritional Hx/Data


      Height                                     1.7 m


      Height (Calculated Centimeters)            170.2


      Current Weight (lbs)                       70.307 kg


      Weight (Calculated Kilograms)              70.3


      Weight (Calculated Grams)                  82732.8


      Ideal Body Weight                          148 LB(67.27 kg)


      % Ideal Body Weight                        105


      Body Mass Index (BMI)                      24.3


      Weight Status                              Approriate


     GI Symptoms


      GI Symptoms                                None


      Last BM                                    10/3 x1


      Skin Integrity/Comment:                    Skin: Area of Concern, ALIX Leg


                                                 abrasions with reddening, LT


                                                 hand bruise, Sacral/Buttock


                                                 open wound


                                                 Philip: 16


      Current %PO                                Good (%)


     Estimated Nutritional Goals


      BEE in Kcals:                              Using Current wt


      Calories/Kcals/Kg                          25-30


      Kcals Calculated                           5860-9223


      Protein:                                   Using Current wt


      Protein g/k.0-1.2


      Protein Calculated                         70-85


      Fluid: ml                                  3855-8996 ml (25-30 ml/kg)


     Nutritional Problem


      1. Problem


       Problem                                   Increased vitamin/mineral


                                                 utilization


       Etiology                                  r/t wound healing


       Signs/Symptoms:                           aeb ALIX Leg abrasions with


                                                 reddening, LT hand bruise,


                                                 Sacral/Buttock open wound.


     Intervention/Recommendation


      Comments                                   1. Continue with Pureed, ROBIN


                                                 diet as ordered.


                                                 2. Recommend addition of


                                                 multivitamin to support wound


                                                 healing and skin integrity.


     Expected Outcomes/Goals


      Expected Outcomes/Goals                    1. PO intake to continue to


                                                 meet >75% of nutritional needs


                                                 .


                                                 2. Monitor PO intake, wt,


                                                 nutrition related labs, and


                                                 skin integrity to trend WNL.


                                                 3. F/U as moderate risk in 3-5


                                                 days, 10/6-10/8.

## 2019-10-05 NOTE — PROGRESS NOTES
DATE:  10/03/2019



SUBJECTIVE:  Chart reviewed and the patient interviewed.  Also discussed the

patient's condition with the staff and reviewed records and labs.  The patient

is still confused and he still has disorganized thoughts and preoccupied.  The

patient also is still restless and is still severely confused and resisting

care.  The patient also is to have wound in the sacral area and staff has been

turning the patient's more to avoid any of further wounds and also avoiding bed

sores.  Otherwise, the patient is compliant with taking his medications with no

side effects of medications.



ASSESSMENT:  The patient is still confused and agitated.



TREATMENT PLAN:  Continue to monitor behavior and condition closely.  Depakote

blood level that was done on 10/01/2019 came back to be 18, which is

subtherapeutic, but the patient was not taking Depakote regularly before.  We

will continue same dose and continue adjusting medications and followup.





DD: 10/04/2019 18:44

DT: 10/05/2019 01:54

JOB# [id number]  [id number]

## 2019-10-05 NOTE — PROGRESS NOTES
DATE:  10/04/2019



SUBJECTIVE:  Chart reviewed and the patient interviewed.  Also discussed the

patient's condition with the staff and reviewed records and labs.  The patient

is still confused and in angry and irritable mood.  The patient also is

restless.  Today during interview, the patient was trying to take off part of

his clothes.  Still have difficulty redirecting him because of confusion and

because of being disoriented and irritable.  Otherwise, the patient continued to

comply with taking his medications with no side effects of Seroquel or Depakote.



ASSESSMENT:  The patient is still agitated and confused.



TREATMENT PLAN:  Continue to monitor behavior and condition closely.  Also,

continue adjusting psychotropic medications and work on behavioral modification.





DD: 10/04/2019 18:56

DT: 10/05/2019 02:15

[facility]# [id number]  [id number]

## 2019-10-05 NOTE — PROGRESS NOTES
DATE:  10/05/2019



SUBJECTIVE:  The patient was seen in the dining area, currently in a Elizabeth chair.

 Per nurse report, the patient has poor sleep last night, even though oral as

needed medication to help him sleep were given.  Today, the patient appears to

be drowsy, but a poor historian, episodes of agitation, easily gets frustrated

with behavioral outburst.  The patient needs a lot of redirection.  Otherwise,

the patient is in no acute distress.



OBJECTIVE:

VITAL SIGNS:  Temperature 97.6, heart rate 78, blood pressure 111/59,

respirations 19, and 96% on room air.

HEENT:  Head is atraumatic and normocephalic.  Eyes:  Bilateral conjunctivae are

clear.  Bilateral pupils are equally round and reactive.

NECK:  Supple.  No JVD.

CARDIOVASCULAR:  S1 and S2, without murmur.

PULMONARY:  Clear to auscultation.

GASTROINTESTINAL:  Soft and nontender without guarding.  Positive bowel sounds.

MUSCULOSKELETAL:  No clubbing.  No cyanosis noted.



ASSESSMENT:

1.  Dementia.

2.  Hyperlipidemia.

3.  Coronary artery disease.



PLAN:  We will keep the patient inpatient to Psychiatric Unit.  We will follow

up with the psychiatrist to monitor the patient's condition, behavior and

progress.  We will put the patient on fall and aspiration precaution.  Treatment

plans were discussed with the patient's nurse.  Treatment plans were discussed

with Dr. Rogel.





DD: 10/05/2019 06:22

DT: 10/05/2019 07:33

JOB# [id number]  [id number]

## 2019-10-06 NOTE — INTERNAL MEDICINE PROG NOTE
Internal Medicine Subjective





- Subjective


Patient is:: awake, asleep, arousable, garfield chair, agitated, confused


Patient Complaints of:: other (Anxiety.)


Per staff patient has:: no adverse event, no episodes of fall, agitated, 

confused





Internal Medicine Objective





- Physical Exam


Vitals and I&O: 


 Vital Signs











Temp  98.4 F   10/06/19 14:00


 


Pulse  76   10/06/19 14:00


 


Resp  20   10/06/19 14:00


 


BP  118/64   10/06/19 14:00


 


Pulse Ox  98   10/06/19 14:00








 Intake & Output











 10/05/19 10/06/19 10/06/19





 18:59 06:59 18:59


 


Intake Total 1200 120 


 


Balance 1200 120 


 


Intake:   


 


  Oral 1200 120 


 


Other:   


 


  # Voids  3 


 


  # Bowel Movements 1  











Active Medications: 


Current Medications





Acetaminophen (Tylenol)  650 mg PO Q4H PRN


   PRN Reason: Mild Pain/Headache/T above 101


   Stop: 19 14:21


   Last Admin: 10/05/19 20:48 Dose:  650 mg


Al Hydrox/Mg Hydrox/Simethicone (Maalox)  30 ml PO Q4HR PRN


   PRN Reason: FPR HEARTBURN


   Stop: 19 09:09


Aspirin (Aspirin)  325 mg PO DAILY MARY


   Stop: 19 08:59


   Last Admin: 10/06/19 08:45 Dose:  325 mg


Atorvastatin Calcium (Lipitor)  80 mg PO HS MARY; Protocol


   Stop: 19 20:59


   Last Admin: 10/05/19 20:48 Dose:  80 mg


Calamine/Phenol (Calmoseptine)  1 appl TP QID PRN


   PRN Reason: Skin Irritation


   Stop: 19 17:06


   Last Admin: 10/04/19 00:43 Dose:  1 appl


Clopidogrel Bisulfate (Plavix)  75 mg PO DAILY MARY


   Stop: 19 08:59


   Last Admin: 10/06/19 08:44 Dose:  75 mg


Divalproex Sodium (Depakote Sprinkle)  250 mg PO HS MARY; Protocol


   Stop: 19 20:59


   Last Admin: 10/05/19 20:48 Dose:  250 mg


Divalproex Sodium (Depakote Sprinkle)  250 mg PO QPM MARY; Protocol


   Stop: 19 16:59


   Last Admin: 10/05/19 17:25 Dose:  250 mg


Docusate Sodium (Colace)  100 mg PO BID MARY


   Stop: 19 16:59


   Last Admin: 10/06/19 08:44 Dose:  100 mg


Lorazepam (Ativan)  0.5 mg PO Q4H PRN; Protocol


   PRN Reason: Anxiety/Agitation


   Stop: 19 14:21


   Last Admin: 10/05/19 20:48 Dose:  0.5 mg


Magnesium Hydroxide (Milk Of Magnesia)  30 ml PO HS PRN


   PRN Reason: Constipation


   Stop: 19 14:21


Quetiapine Fumarate (Seroquel)  50 mg PO BID MARY


   Stop: 19 08:59


   Last Admin: 10/06/19 08:45 Dose:  50 mg


Quetiapine Fumarate (Seroquel)  100 mg PO HS MARY; Protocol


   Stop: 19 20:59


Zolpidem Tartrate (Ambien)  5 mg PO HS PRN


   PRN Reason: Insomnia


   Stop: 19 14:21


   Last Admin: 10/03/19 20:05 Dose:  5 mg








General: weak, alert, disheveled


HEENT: NC/AT


Neck: Supple, No JVD


Lungs: other (no acute respiratory distress)


Cardiovascular: RRR


Abdomen: soft, non-tender, non-distended


Extremities: ecchymosis


Neurological: no change, unable to follow command (confused, demented)





Internal Medicine Assmt/Plan





- Assessment


Assessment: 





Dementia


Schizoaffective disorder


Acute Psychosis


Insomnia


Hx CAD


Hx Stoke


Hyperlipidemia





- Plan


Plan: 





Continue current treatment plan.


Monitor Labs.Continue current medications


Continue to monitor VS


Monitor Diet/Nutritional support.


Psych management per Psychiatry.


Pain Management.


Wound Care consult - pending - ordered yesterday


PT/OT prn


Safety precaution, Fall precaution, frequent nursing round.


Supportive care. 


Continue collaborating with consulting specialists, case management and nursing 

team.





Nutritional Asmnt/Malnutr-PDOC





- Dietary Evaluation


Malnutrition Findings (Please click <Entered> for more info): 








Nutritional Asmnt/Malnutrition                             Start:  10/03/19 15:

45


Text:                                                      Status: Complete    

  


Freq:                                                                          

  


Protocol:                                                                      

  


 Document     10/03/19 15:55  CARLIN  (Rec: 10/03/19 16:00  CARLIN [id number])


 Nutritional Asmnt/Malnutrition


     Patient General Information


      Nutritional Screening                      Low Risk


      Diagnosis                                  Psychosis


      Pertinent Medical Hx/Surgical Hx           TIA, CAD, Hyperlipidemia,


                                                 Schizoaffective Disorder,


                                                 Bipolar Type, Dementia


      Subjective Information                     Pt is a 69-year-old male


                                                 admitted on  on a 5150. Pt


                                                 is eating % of meals


                                                 Per Meal/Nutrition Activity


                                                 Record. Dietary is currently


                                                 providing an estimated 2370


                                                 kcals and 107gm Pro, per Pt


                                                 intake, this is providing an


                                                 estimated 2070 kcals and 94gm


                                                 Pro to meet 100% kcal and 100+


                                                 % Pro needs- adequate to meet


                                                 estimated nutritional needs.


                                                 Per wound care note (10/2), 


                                                 Right Lower Sacral and Buttock


                                                 area: Reopened scar tissue


                                                 from a wound of prior unknown


                                                 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


                                                 MASD. Open sites have 100% red


                                                 tissue. No odor, no drainage.


                                                 Yuli-wound intact.


                                                 Surrounding tissue has dark


                                                 discolored flaky skin and scar


                                                 tissue. Moisture barrier


                                                 cream nearly covered all of


                                                 the non-intact areas. Entire


                                                 site measures 13.0 cm x 15.0


                                                 cm (includes dark discolored


                                                 flaky skin, with a few


                                                 sporadic non-intact sites


                                                 within the dark discolored


                                                 flaky skin). Spoke with pt


                                                 Nurse, Colette, concerning wound


                                                 . Suggested either a


                                                 multivitamin or Prakash to


                                                 support wound healing, Colette


                                                 stated he would speak with MD


                                                 concerning a Multivitamin as


                                                 he did not believe the pt


                                                 would drink the Prakash.


                                                 HT: 57


                                                 WT: 155 LB (70.45 kg)


                                                 BMI: 24.28 (Normal)


                                                 GI: WNL, Soft, Non-tender


                                                 BM: 10/3 x1


                                                 I/O: 920/Not Noted


                                                 Skin: Area of Concern, ALIX Leg


                                                 abrasions with reddening, LT


                                                 hand bruise, Sacral/Buttock


                                                 open wound


                                                 Philip: 16


                                                 Diet Order: Pureed, ROBIN


                                                 Estimated Energy Needs: (


                                                 Geriatric, CBW)


                                                 5803-3211 kcals (25-30 kcals/


                                                 kg)


                                                 70-85g Pro (1.0-1.2 g/kg)


                                                 9881-3090 ml (25-30 ml/kg)


      Current Diet Order/ Nutrition Support      Pureed, ROBIN


      Pertinent Medications                      Maalox (PRN), Lipitor, Colace,


                                                 MOM (PRN)


      Pertinent Labs                             No Labs To Report.


     Nutritional Hx/Data


      Height                                     5 ft 7 in


      Height (Calculated Centimeters)            170.2


      Current Weight (lbs)                       155 lb


      Weight (Calculated Kilograms)              70.3


      Weight (Calculated Grams)                  76624.8


      Ideal Body Weight                          148 LB(67.27 kg)


      % Ideal Body Weight                        105


      Body Mass Index (BMI)                      24.3


      Weight Status                              Approriate


     GI Symptoms


      GI Symptoms                                None


      Last BM                                    103 x1


      Skin Integrity/Comment:                    Skin: Area of Concern, ALIX Leg


                                                 abrasions with reddening, LT


                                                 hand bruise, Sacral/Buttock


                                                 open wound


                                                 Philip: 16


      Current %PO                                Good (%)


     Estimated Nutritional Goals


      BEE in Kcals:                              Using Current wt


      Calories/Kcals/Kg                          25-30


      Kcals Calculated                           3409-4806


      Protein:                                   Using Current wt


      Protein g/k.0-1.2


      Protein Calculated                         70-85


      Fluid: ml                                  9851-0439 ml (25-30 ml/kg)


     Nutritional Problem


      1. Problem


       Problem                                   Increased vitamin/mineral


                                                 utilization


       Etiology                                  r/t wound healing


       Signs/Symptoms:                           aeb ALIX Leg abrasions with


                                                 reddening, LT hand bruise,


                                                 Sacral/Buttock open wound.


     Intervention/Recommendation


      Comments                                   1. Continue with Pureed, ROBIN


                                                 diet as ordered.


                                                 2. Recommend addition of


                                                 multivitamin to support wound


                                                 healing and skin integrity.


     Expected Outcomes/Goals


      Expected Outcomes/Goals                    1. PO intake to continue to


                                                 meet >75% of nutritional needs


                                                 .


                                                 2. Monitor PO intake, wt,


                                                 nutrition related labs, and


                                                 skin integrity to trend WNL.


                                                 3. F/U as moderate risk in 3-5


                                                 days, 10/6-10/8.

## 2019-10-06 NOTE — INTERNAL MEDICINE PROG NOTE
Internal Medicine Subjective





- Subjective


Service Date: 10/06/19


Patient seen and examined:: with staff


Patient is:: awake, verbal, garfield chair, agitated, confused


Patient Complaints of:: other (Anxiety.)


Per staff patient has:: no adverse event, no episodes of fall, agitated, 

confused





Internal Medicine Objective





- Physical Exam


Vitals and I&O: 


 Vital Signs











Temp  97.3 F   10/06/19 06:20


 


Pulse  64   10/06/19 06:20


 


Resp  18   10/06/19 06:20


 


BP  116/71   10/06/19 06:20


 


Pulse Ox  97   10/06/19 06:20








 Intake & Output











 10/05/19 10/06/19 10/06/19





 18:59 06:59 18:59


 


Intake Total 1200 120 


 


Balance 1200 120 


 


Intake:   


 


  Oral 1200 120 


 


Other:   


 


  # Voids  3 


 


  # Bowel Movements 1  











Active Medications: 


Current Medications





Acetaminophen (Tylenol)  650 mg PO Q4H PRN


   PRN Reason: Mild Pain/Headache/T above 101


   Stop: 19 14:21


   Last Admin: 10/05/19 20:48 Dose:  650 mg


Al Hydrox/Mg Hydrox/Simethicone (Maalox)  30 ml PO Q4HR PRN


   PRN Reason: FPR HEARTBURN


   Stop: 19 09:09


Aspirin (Aspirin)  325 mg PO DAILY MARY


   Stop: 19 08:59


   Last Admin: 10/06/19 08:45 Dose:  325 mg


Atorvastatin Calcium (Lipitor)  80 mg PO HS MARY; Protocol


   Stop: 19 20:59


   Last Admin: 10/05/19 20:48 Dose:  80 mg


Calamine/Phenol (Calmoseptine)  1 appl TP QID PRN


   PRN Reason: Skin Irritation


   Stop: 19 17:06


   Last Admin: 10/04/19 00:43 Dose:  1 appl


Clopidogrel Bisulfate (Plavix)  75 mg PO DAILY MARY


   Stop: 19 08:59


   Last Admin: 10/06/19 08:44 Dose:  75 mg


Divalproex Sodium (Depakote Sprinkle)  250 mg PO HS MARY; Protocol


   Stop: 19 20:59


   Last Admin: 10/05/19 20:48 Dose:  250 mg


Divalproex Sodium (Depakote Sprinkle)  250 mg PO QPM MARY; Protocol


   Stop: 19 16:59


   Last Admin: 10/05/19 17:25 Dose:  250 mg


Docusate Sodium (Colace)  100 mg PO BID MARY


   Stop: 19 16:59


   Last Admin: 10/06/19 08:44 Dose:  100 mg


Lorazepam (Ativan)  0.5 mg PO Q4H PRN; Protocol


   PRN Reason: Anxiety/Agitation


   Stop: 19 14:21


   Last Admin: 10/05/19 20:48 Dose:  0.5 mg


Magnesium Hydroxide (Milk Of Magnesia)  30 ml PO HS PRN


   PRN Reason: Constipation


   Stop: 19 14:21


Quetiapine Fumarate (Seroquel)  50 mg PO BID MARY


   Stop: 19 08:59


   Last Admin: 10/06/19 08:45 Dose:  50 mg


Quetiapine Fumarate (Seroquel)  100 mg PO HS MARY; Protocol


   Stop: 19 20:59


Zolpidem Tartrate (Ambien)  5 mg PO HS PRN


   PRN Reason: Insomnia


   Stop: 19 14:21


   Last Admin: 10/03/19 20:05 Dose:  5 mg








Physical Exam: 





Patient is still hallucination, psychotic and hyper talkative.


General: weak, alert, disheveled


HEENT: NC/AT


Neck: Supple, No JVD


Lungs: other (no acute respiratory distress)


Cardiovascular: RRR


Abdomen: soft, non-tender, non-distended


Extremities: ecchymosis


Neurological: no change, unable to follow command (confused, demented)





Internal Medicine Assmt/Plan





- Assessment


Assessment: 





Insomnia.


Acute psychosis.


Dementia.


Schizoaffective disorder.


H/o CAD.


H/o Stroke.


H/o Hyperlipidemia.





- Plan


Plan: 





Continuation of care.


Monitor labs and vitals.


Monitor diet/nutritional support.


Psych management per Psych.


Continue present meds as directed.


Continue current treatment plan as ordered.





Nutritional Asmnt/Malnutr-PDOC





- Dietary Evaluation


Malnutrition Findings (Please click <Entered> for more info): 








Nutritional Asmnt/Malnutrition                             Start:  10/03/19 15:

45


Text:                                                      Status: Complete    

  


Freq:                                                                          

  


Protocol:                                                                      

  


 Document     10/03/19 15:55  CARLIN  (Rec: 10/03/19 16:00  CARLIN [id number])


 Nutritional Asmnt/Malnutrition


     Patient General Information


      Nutritional Screening                      Low Risk


      Diagnosis                                  Psychosis


      Pertinent Medical Hx/Surgical Hx           TIA, CAD, Hyperlipidemia,


                                                 Schizoaffective Disorder,


                                                 Bipolar Type, Dementia


      Subjective Information                     Pt is a 69-year-old male


                                                 admitted on  on a 5150. Pt


                                                 is eating % of meals


                                                 Per Meal/Nutrition Activity


                                                 Record. Dietary is currently


                                                 providing an estimated 2370


                                                 kcals and 107gm Pro, per Pt


                                                 intake, this is providing an


                                                 estimated 2070 kcals and 94gm


                                                 Pro to meet 100% kcal and 100+


                                                 % Pro needs- adequate to meet


                                                 estimated nutritional needs.


                                                 Per wound care note (10/2), 


                                                 Right Lower Sacral and Buttock


                                                 area: Reopened scar tissue


                                                 from a wound of prior unknown


                                                 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


                                                 MASD. Open sites have 100% red


                                                 tissue. No odor, no drainage.


                                                 Yuli-wound intact.


                                                 Surrounding tissue has dark


                                                 discolored flaky skin and scar


                                                 tissue. Moisture barrier


                                                 cream nearly covered all of


                                                 the non-intact areas. Entire


                                                 site measures 13.0 cm x 15.0


                                                 cm (includes dark discolored


                                                 flaky skin, with a few


                                                 sporadic non-intact sites


                                                 within the dark discolored


                                                 flaky skin). Spoke with pt


                                                 Nurse, Colette, concerning wound


                                                 . Suggested either a


                                                 multivitamin or Prakash to


                                                 support wound healing, Colette


                                                 stated he would speak with MD


                                                 concerning a Multivitamin as


                                                 he did not believe the pt


                                                 would drink the Prakash.


                                                 HT: 57


                                                 WT: 155 LB (70.45 kg)


                                                 BMI: 24.28 (Normal)


                                                 GI: WNL, Soft, Non-tender


                                                 BM: 10/3 x1


                                                 I/O: 920/Not Noted


                                                 Skin: Area of Concern, ALIX Leg


                                                 abrasions with reddening, LT


                                                 hand bruise, Sacral/Buttock


                                                 open wound


                                                 Philip: 16


                                                 Diet Order: Pureed, ROBIN


                                                 Estimated Energy Needs: (


                                                 Geriatric, CBW)


                                                 8616-7025 kcals (25-30 kcals/


                                                 kg)


                                                 70-85g Pro (1.0-1.2 g/kg)


                                                 3121-4019 ml (25-30 ml/kg)


      Current Diet Order/ Nutrition Support      Pureed, ROBIN


      Pertinent Medications                      Maalox (PRN), Lipitor, Colace,


                                                 MOM (PRN)


      Pertinent Labs                             No Labs To Report.


     Nutritional Hx/Data


      Height                                     1.7 m


      Height (Calculated Centimeters)            170.2


      Current Weight (lbs)                       70.307 kg


      Weight (Calculated Kilograms)              70.3


      Weight (Calculated Grams)                  16403.8


      Ideal Body Weight                          148 LB(67.27 kg)


      % Ideal Body Weight                        105


      Body Mass Index (BMI)                      24.3


      Weight Status                              Approriate


     GI Symptoms


      GI Symptoms                                None


      Last BM                                    103 x1


      Skin Integrity/Comment:                    Skin: Area of Concern, ALIX Leg


                                                 abrasions with reddening, LT


                                                 hand bruise, Sacral/Buttock


                                                 open wound


                                                 Philip: 16


      Current %PO                                Good (%)


     Estimated Nutritional Goals


      BEE in Kcals:                              Using Current wt


      Calories/Kcals/Kg                          25-30


      Kcals Calculated                           5904-3394


      Protein:                                   Using Current wt


      Protein g/k.0-1.2


      Protein Calculated                         70-85


      Fluid: ml                                  6217-6615 ml (25-30 ml/kg)


     Nutritional Problem


      1. Problem


       Problem                                   Increased vitamin/mineral


                                                 utilization


       Etiology                                  r/t wound healing


       Signs/Symptoms:                           aeb ALIX Leg abrasions with


                                                 reddening, LT hand bruise,


                                                 Sacral/Buttock open wound.


     Intervention/Recommendation


      Comments                                   1. Continue with Pureed, ROBIN


                                                 diet as ordered.


                                                 2. Recommend addition of


                                                 multivitamin to support wound


                                                 healing and skin integrity.


     Expected Outcomes/Goals


      Expected Outcomes/Goals                    1. PO intake to continue to


                                                 meet >75% of nutritional needs


                                                 .


                                                 2. Monitor PO intake, wt,


                                                 nutrition related labs, and


                                                 skin integrity to trend WNL.


                                                 3. F/U as moderate risk in 3-5


                                                 days, 10/6-10/8.

## 2019-10-06 NOTE — PROGRESS NOTES
DATE:  10/05/2019



SUBJECTIVE:  Chart reviewed and the patient interviewed.  Also discussed the

patient's condition with the staff and reviewed records and labs.  The patient

is still hyper talkative and hyperverbal.  Also, thought processes are still

circumstantial and tangential with flight of ideas.  The patient is still having

difficulty sleeping at night and he is still actively hallucinating and talking

to himself and is trying to reach to imaginary objects that are not there. 

Otherwise, the patient is compliant with taking his medications with no side

effects of Seroquel and Depakote.



ASSESSMENT:  The patient is still psychotic and hallucinating.



TREATMENT PLAN:  Continue to monitor behavior and his condition closely.  Also,

continue working on his irritability and psychosis and adjusting psychotropic

medications and continue to follow up.





DD: 10/05/2019 19:31

DT: 10/05/2019 23:18

JOB# [id number]  [id number]

## 2019-10-07 RX ADMIN — ANORECTAL OINTMENT PRN APPL: 15.7; .44; 24; 20.6 OINTMENT TOPICAL at 12:10

## 2019-10-07 NOTE — INTERNAL MEDICINE PROG NOTE
Internal Medicine Subjective





- Subjective


Service Date: 10/07/19


Patient seen and examined:: with staff


Patient is:: awake, asleep, arousable, garfield chair, agitated, confused


Patient Complaints of:: other (Anxiety.)


Per staff patient has:: no adverse event, no episodes of fall, agitated, 

confused





Internal Medicine Objective





- Physical Exam


Vitals and I&O: 


 Vital Signs











Temp  97.6 F   10/07/19 06:28


 


Pulse  71   10/07/19 06:28


 


Resp  20   10/07/19 06:28


 


BP  124/74   10/07/19 06:28


 


Pulse Ox  98   10/07/19 06:28








 Intake & Output











 10/06/19 10/07/19 10/07/19





 18:59 06:59 18:59


 


Intake Total 600 240 


 


Balance 600 240 


 


Intake:   


 


  Oral 600 240 


 


Other:   


 


  # Voids 3 2 


 


  # Bowel Movements 0 0 











Active Medications: 


Current Medications





Acetaminophen (Tylenol)  650 mg PO Q4H PRN


   PRN Reason: Mild Pain/Headache/T above 101


   Stop: 19 14:21


   Last Admin: 10/05/19 20:48 Dose:  650 mg


Al Hydrox/Mg Hydrox/Simethicone (Maalox)  30 ml PO Q4HR PRN


   PRN Reason: FPR HEARTBURN


   Stop: 19 09:09


Aspirin (Aspirin)  325 mg PO DAILY [facility]


   Stop: 19 08:59


   Last Admin: 10/07/19 08:24 Dose:  325 mg


Atorvastatin Calcium (Lipitor)  80 mg PO HS MARY; Protocol


   Stop: 19 20:59


   Last Admin: 10/06/19 20:59 Dose:  80 mg


Calamine/Phenol (Calmoseptine)  1 appl TP QID PRN


   PRN Reason: Skin Irritation


   Stop: 19 17:06


   Last Admin: 10/04/19 00:43 Dose:  1 appl


Clopidogrel Bisulfate (Plavix)  75 mg PO DAILY MARY


   Stop: 19 08:59


   Last Admin: 10/07/19 08:24 Dose:  75 mg


Divalproex Sodium (Depakote Sprinkle)  250 mg PO HS MARY; Protocol


   Stop: 19 20:59


   Last Admin: 10/06/19 21:00 Dose:  250 mg


Divalproex Sodium (Depakote Sprinkle)  250 mg PO QPM MARY; Protocol


   Stop: 19 16:59


   Last Admin: 10/06/19 17:32 Dose:  250 mg


Docusate Sodium (Colace)  100 mg PO BID MARY


   Stop: 19 16:59


   Last Admin: 10/06/19 17:32 Dose:  100 mg


Lorazepam (Ativan)  0.5 mg PO Q4H PRN; Protocol


   PRN Reason: Anxiety/Agitation


   Stop: 19 14:21


   Last Admin: 10/05/19 20:48 Dose:  0.5 mg


Magnesium Hydroxide (Milk Of Magnesia)  30 ml PO HS PRN


   PRN Reason: Constipation


   Stop: 19 14:21


Quetiapine Fumarate (Seroquel)  50 mg PO BID MARY


   Stop: 19 08:59


   Last Admin: 10/07/19 08:24 Dose:  50 mg


Quetiapine Fumarate 100 mg/ (Quetiapine Fumarate 25 mg)  125 mg PO HS MARY


   Stop: 19 20:59


Zolpidem Tartrate (Ambien)  5 mg PO HS PRN


   PRN Reason: Insomnia


   Stop: 19 14:21


   Last Admin: 10/03/19 20:05 Dose:  5 mg








Physical Exam: 


Patient is very confused and talking to himself, hallucinating.


General: weak, alert, disheveled


HEENT: NC/AT


Neck: Supple, No JVD


Lungs: other (no acute respiratory distress)


Cardiovascular: RRR


Abdomen: soft, non-tender, non-distended


Extremities: ecchymosis


Neurological: no change, unable to follow command (confused, demented)





Internal Medicine Assmt/Plan





- Assessment


Assessment: 


Insomnia.


Acute psychosis.


Dementia.


Schizoaffective disorder.


H/o CAD.


H/o Stroke.


H/o Hyperlipidemia.





- Plan


Plan: 


Continuation of care.


Monitor labs and vitals.


Monitor diet/nutritional support.


Psych management per Psych.


Continue present meds as directed.


Continue current treatment plan as ordered.





Nutritional Asmnt/Malnutr-PDOC





- Dietary Evaluation


Malnutrition Findings (Please click <Entered> for more info): 








Nutritional Asmnt/Malnutrition                             Start:  10/03/19 15:

45


Text:                                                      Status: Complete    

  


Freq:                                                                          

  


Protocol:                                                                      

  


 Document     10/03/19 15:55  MAUMUS  (Rec: 10/03/19 16:00  CARLIN [id number])


 Nutritional Asmnt/Malnutrition


     Patient General Information


      Nutritional Screening                      Low Risk


      Diagnosis                                  Psychosis


      Pertinent Medical Hx/Surgical Hx           TIA, CAD, Hyperlipidemia,


                                                 Schizoaffective Disorder,


                                                 Bipolar Type, Dementia


      Subjective Information                     Pt is a 69-year-old male


                                                 admitted on  on a 5150. Pt


                                                 is eating % of meals


                                                 Per Meal/Nutrition Activity


                                                 Record. Dietary is currently


                                                 providing an estimated 2370


                                                 kcals and 107gm Pro, per Pt


                                                 intake, this is providing an


                                                 estimated 2070 kcals and 94gm


                                                 Pro to meet 100% kcal and 100+


                                                 % Pro needs- adequate to meet


                                                 estimated nutritional needs.


                                                 Per wound care note (10/2), 


                                                 Right Lower Sacral and Buttock


                                                 area: Reopened scar tissue


                                                 from a wound of prior unknown


                                                 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


                                                 MASD. Open sites have 100% red


                                                 tissue. No odor, no drainage.


                                                 Yuli-wound intact.


                                                 Surrounding tissue has dark


                                                 discolored flaky skin and scar


                                                 tissue. Moisture barrier


                                                 cream nearly covered all of


                                                 the non-intact areas. Entire


                                                 site measures 13.0 cm x 15.0


                                                 cm (includes dark discolored


                                                 flaky skin, with a few


                                                 sporadic non-intact sites


                                                 within the dark discolored


                                                 flaky skin). Spoke with pt


                                                 Nurse, Colette, concerning wound


                                                 . Suggested either a


                                                 multivitamin or Prakash to


                                                 support wound healing, Colette


                                                 stated he would speak with MD


                                                 concerning a Multivitamin as


                                                 he did not believe the pt


                                                 would drink the Prakash.


                                                 HT: 57


                                                 WT: 155 LB (70.45 kg)


                                                 BMI: 24.28 (Normal)


                                                 GI: WNL, Soft, Non-tender


                                                 BM: 10/3 x1


                                                 I/O: 920/Not Noted


                                                 Skin: Area of Concern, ALIX Leg


                                                 abrasions with reddening, LT


                                                 hand bruise, Sacral/Buttock


                                                 open wound


                                                 Philip: 16


                                                 Diet Order: Pureed, ROBIN


                                                 Estimated Energy Needs: (


                                                 Geriatric, CBW)


                                                 7997-5066 kcals (25-30 kcals/


                                                 kg)


                                                 70-85g Pro (1.0-1.2 g/kg)


                                                 9786-3488 ml (25-30 ml/kg)


      Current Diet Order/ Nutrition Support      Pureed, ROBIN


      Pertinent Medications                      Maalox (PRN), Lipitor, Colace,


                                                 MOM (PRN)


      Pertinent Labs                             No Labs To Report.


     Nutritional Hx/Data


      Height                                     1.7 m


      Height (Calculated Centimeters)            170.2


      Current Weight (lbs)                       70.307 kg


      Weight (Calculated Kilograms)              70.3


      Weight (Calculated Grams)                  26555.8


      Ideal Body Weight                          148 LB(67.27 kg)


      % Ideal Body Weight                        105


      Body Mass Index (BMI)                      24.3


      Weight Status                              Approriate


     GI Symptoms


      GI Symptoms                                None


      Last BM                                    10/3 x1


      Skin Integrity/Comment:                    Skin: Area of Concern, ALIX Leg


                                                 abrasions with reddening, LT


                                                 hand bruise, Sacral/Buttock


                                                 open wound


                                                 Philip: 16


      Current %PO                                Good (%)


     Estimated Nutritional Goals


      BEE in Kcals:                              Using Current wt


      Calories/Kcals/Kg                          25-30


      Kcals Calculated                           9881-4582


      Protein:                                   Using Current wt


      Protein g/k.0-1.2


      Protein Calculated                         70-85


      Fluid: ml                                  4703-9312 ml (25-30 ml/kg)


     Nutritional Problem


      1. Problem


       Problem                                   Increased vitamin/mineral


                                                 utilization


       Etiology                                  r/t wound healing


       Signs/Symptoms:                           aeb ALIX Leg abrasions with


                                                 reddening, LT hand bruise,


                                                 Sacral/Buttock open wound.


     Intervention/Recommendation


      Comments                                   1. Continue with Pureed, ROBIN


                                                 diet as ordered.


                                                 2. Recommend addition of


                                                 multivitamin to support wound


                                                 healing and skin integrity.


     Expected Outcomes/Goals


      Expected Outcomes/Goals                    1. PO intake to continue to


                                                 meet >75% of nutritional needs


                                                 .


                                                 2. Monitor PO intake, wt,


                                                 nutrition related labs, and


                                                 skin integrity to trend WNL.


                                                 3. F/U as moderate risk in 3-5


                                                 days, 10/6-10/8.

## 2019-10-07 NOTE — PROGRESS NOTES
DATE:  



SUBJECTIVE:  Chart reviewed and the patient interviewed.  Also discussed the

patient's condition with the staff and reviewed records and labs.  The patient

continued to be in irritable and angry mood.  The patient also is still restless

and easily agitated and easily irritable.  The patient also seems to be

preoccupied.  The patient also has severe mood swings.  Otherwise, the patient

is compliant with taking his medications with no side effects of Depakote or

Seroquel.



ASSESSMENT:  The patient is still confused and agitated.



TREATMENT PLAN:  Continue to monitor behavior and condition closely.  Also,

continue Seroquel 50 mg twice a day and 100 mg at bedtime and continue to work

on behavior modification and followup.





DD: 10/07/2019 06:20

DT: 10/07/2019 21:27

[facility]# [id number]  [id number]

## 2019-10-07 NOTE — PROGRESS NOTES
DATE:  10/06/2019



Chart reviewed and the patient interviewed.  Also discussed the patient's

condition with the staff and reviewed records and labs.  The patient is still in

angry and irritable mood.  The patient also still hypertalkative and

argumentative and actively responding and hallucinating and talking to imaginary

objects.  The patient also is talking to himself and nonstop talking with

difficulty redirecting.  Otherwise, the patient is compliant with taking his

medication and Seroquel was changed to 50 mg twice a day and 100 mg at bedtime. 

Hopefully, that can help him to sleep better at night.  At the same time, we

will continue monitoring behavior closely and we will continue to follow up.





DD: 10/06/2019 21:29

DT: 10/07/2019 07:54

[facility]# [id number]  [id number]

## 2019-10-08 RX ADMIN — ANORECTAL OINTMENT PRN APPL: 15.7; .44; 24; 20.6 OINTMENT TOPICAL at 06:00

## 2019-10-08 RX ADMIN — ANORECTAL OINTMENT PRN APPL: 15.7; .44; 24; 20.6 OINTMENT TOPICAL at 21:30

## 2019-10-08 NOTE — INTERNAL MEDICINE PROG NOTE
Internal Medicine Subjective





- Subjective


Service Date: 10/08/19


Patient seen and examined:: with staff


Patient is:: awake, asleep, arousable, garfield chair, agitated, confused


Patient Complaints of:: other (Anxiety.)


Per staff patient has:: no adverse event, no episodes of fall, agitated, 

confused





Internal Medicine Objective





- Physical Exam


Vitals and I&O: 


 Vital Signs











Temp  97.8 F   10/08/19 06:48


 


Pulse  103   10/08/19 06:48


 


Resp  18   10/08/19 08:00


 


BP  113/79   10/08/19 06:48


 


Pulse Ox  96   10/08/19 06:48








 Intake & Output











 10/07/19 10/08/19 10/08/19





 18:59 06:59 18:59


 


Intake Total 960 60 


 


Balance 960 60 


 


Intake:   


 


  Oral 960 60 


 


Other:   


 


  # Voids 3 3 


 


  # Bowel Movements 1 0 











Active Medications: 


Current Medications





Acetaminophen (Tylenol)  650 mg PO Q4H PRN


   PRN Reason: Mild Pain/Headache/T above 101


   Stop: 19 14:21


   Last Admin: 10/05/19 20:48 Dose:  650 mg


Al Hydrox/Mg Hydrox/Simethicone (Maalox)  30 ml PO Q4HR PRN


   PRN Reason: FPR HEARTBURN


   Stop: 19 09:09


Aspirin (Aspirin)  325 mg PO DAILY [facility]


   Stop: 19 08:59


   Last Admin: 10/08/19 08:55 Dose:  325 mg


Atorvastatin Calcium (Lipitor)  80 mg PO HS MARY; Protocol


   Stop: 19 20:59


   Last Admin: 10/07/19 21:04 Dose:  80 mg


Calamine/Phenol (Calmoseptine)  1 appl TP QID PRN


   PRN Reason: Skin Irritation


   Stop: 19 17:06


   Last Admin: 10/08/19 06:00 Dose:  1 appl


Clopidogrel Bisulfate (Plavix)  75 mg PO DAILY [facility]


   Stop: 19 08:59


   Last Admin: 10/08/19 08:55 Dose:  75 mg


Divalproex Sodium (Depakote Sprinkle)  250 mg PO HS MARY; Protocol


   Stop: 19 20:59


   Last Admin: 10/07/19 21:04 Dose:  250 mg


Divalproex Sodium (Depakote Sprinkle)  250 mg PO QPM [facility]; Protocol


   Stop: 19 16:59


   Last Admin: 10/07/19 17:04 Dose:  250 mg


Docusate Sodium (Colace)  100 mg PO BID [facility]


   Stop: 19 16:59


   Last Admin: 10/08/19 08:55 Dose:  100 mg


Lorazepam (Ativan)  0.5 mg PO Q4H PRN; Protocol


   PRN Reason: Anxiety/Agitation


   Stop: 19 14:21


   Last Admin: 10/05/19 20:48 Dose:  0.5 mg


Magnesium Hydroxide (Milk Of Magnesia)  30 ml PO HS PRN


   PRN Reason: Constipation


   Stop: 19 14:21


Quetiapine Fumarate (Seroquel)  50 mg PO BID [facility]


   Stop: 19 08:59


   Last Admin: 10/08/19 08:55 Dose:  50 mg


Quetiapine Fumarate 100 mg/ (Quetiapine Fumarate 50 mg)  150 mg PO HS [facility]


   Stop: 19 20:59


Zolpidem Tartrate (Ambien)  5 mg PO HS PRN


   PRN Reason: Insomnia


   Stop: 19 14:21


   Last Admin: 10/08/19 00:38 Dose:  5 mg








Physical Exam: 





Patient has difficulty sleeping and is very weak.


General: weak, alert, disheveled


HEENT: NC/AT


Neck: Supple, No JVD


Lungs: other (no acute respiratory distress)


Cardiovascular: RRR


Abdomen: soft, non-tender, non-distended


Extremities: ecchymosis


Neurological: no change, unable to follow command (confused, demented)





Internal Medicine Assmt/Plan





- Assessment


Assessment: 





Insomnia.


Acute psychosis.


Dementia.


Schizoaffective disorder.


H/o CAD.


H/o Stroke.


H/o Hyperlipidemia.





- Plan


Plan: 





Continuation of care.


Monitor labs and vitals.


Monitor diet/nutritional support.


Psych management per Psych.


Continue present meds as directed.


Continue current treatment plan as ordered.





Nutritional Asmnt/Malnutr-PDOC





- Dietary Evaluation


Malnutrition Findings (Please click <Entered> for more info): 








Nutritional Asmnt/Malnutrition                             Start:  10/03/19 15:

45


Text:                                                      Status: Complete    

  


Freq:                                                                          

  


Protocol:                                                                      

  


 Document     10/03/19 15:55  JEXAMUS  (Rec: 10/03/19 16:00  [email or web address]  [email or web address])


 Nutritional Asmnt/Malnutrition


     Patient General Information


      Nutritional Screening                      Low Risk


      Diagnosis                                  Psychosis


      Pertinent Medical Hx/Surgical Hx           TIA, CAD, Hyperlipidemia,


                                                 Schizoaffective Disorder,


                                                 Bipolar Type, Dementia


      Subjective Information                     Pt is a 69-year-old male


                                                 admitted on  on a 5150. Pt


                                                 is eating % of meals


                                                 Per Meal/Nutrition Activity


                                                 Record. Dietary is currently


                                                 providing an estimated 2370


                                                 kcals and 107gm Pro, per Pt


                                                 intake, this is providing an


                                                 estimated 2070 kcals and 94gm


                                                 Pro to meet 100% kcal and 100+


                                                 % Pro needs- adequate to meet


                                                 estimated nutritional needs.


                                                 Per wound care note (10/2), 


                                                 Right Lower Sacral and Buttock


                                                 area: Reopened scar tissue


                                                 from a wound of prior unknown


                                                 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


                                                 MASD. Open sites have 100% red


                                                 tissue. No odor, no drainage.


                                                 Yuli-wound intact.


                                                 Surrounding tissue has dark


                                                 discolored flaky skin and scar


                                                 tissue. Moisture barrier


                                                 cream nearly covered all of


                                                 the non-intact areas. Entire


                                                 site measures 13.0 cm x 15.0


                                                 cm (includes dark discolored


                                                 flaky skin, with a few


                                                 sporadic non-intact sites


                                                 within the dark discolored


                                                 flaky skin). Spoke with pt


                                                 Nurse, Colette, concerning wound


                                                 . Suggested either a


                                                 multivitamin or Prakash to


                                                 support wound healing, Colette


                                                 stated he would speak with MD


                                                 concerning a Multivitamin as


                                                 he did not believe the pt


                                                 would drink the Prakash.


                                                 HT: 57


                                                 WT: 155 LB (70.45 kg)


                                                 BMI: 24.28 (Normal)


                                                 GI: WNL, Soft, Non-tender


                                                 BM: 10/3 x1


                                                 I/O: 920/Not Noted


                                                 Skin: Area of Concern, ALIX Leg


                                                 abrasions with reddening, LT


                                                 hand bruise, Sacral/Buttock


                                                 open wound


                                                 Philip: 16


                                                 Diet Order: Pureed, ROBIN


                                                 Estimated Energy Needs: (


                                                 Geriatric, CBW)


                                                 5447-9716 kcals (25-30 kcals/


                                                 kg)


                                                 70-85g Pro (1.0-1.2 g/kg)


                                                 8187-5837 ml (25-30 ml/kg)


      Current Diet Order/ Nutrition Support      Pureed, ROBIN


      Pertinent Medications                      Maalox (PRN), Lipitor, Colace,


                                                 MOM (PRN)


      Pertinent Labs                             No Labs To Report.


     Nutritional Hx/Data


      Height                                     1.7 m


      Height (Calculated Centimeters)            170.2


      Current Weight (lbs)                       70.307 kg


      Weight (Calculated Kilograms)              70.3


      Weight (Calculated Grams)                  06212.8


      Ideal Body Weight                          148 LB(67.27 kg)


      % Ideal Body Weight                        105


      Body Mass Index (BMI)                      24.3


      Weight Status                              Approriate


     GI Symptoms


      GI Symptoms                                None


      Last BM                                    10/3 x1


      Skin Integrity/Comment:                    Skin: Area of Concern, ALIX Leg


                                                 abrasions with reddening, LT


                                                 hand bruise, Sacral/Buttock


                                                 open wound


                                                 Philip: 16


      Current %PO                                Good (%)


     Estimated Nutritional Goals


      BEE in Kcals:                              Using Current wt


      Calories/Kcals/Kg                          25-30


      Kcals Calculated                           3680-5933


      Protein:                                   Using Current wt


      Protein g/k.0-1.2


      Protein Calculated                         70-85


      Fluid: ml                                  7418-8072 ml (25-30 ml/kg)


     Nutritional Problem


      1. Problem


       Problem                                   Increased vitamin/mineral


                                                 utilization


       Etiology                                  r/t wound healing


       Signs/Symptoms:                           aeb ALIX Leg abrasions with


                                                 reddening, LT hand bruise,


                                                 Sacral/Buttock open wound.


     Intervention/Recommendation


      Comments                                   1. Continue with Pureed, ROBIN


                                                 diet as ordered.


                                                 2. Recommend addition of


                                                 multivitamin to support wound


                                                 healing and skin integrity.


     Expected Outcomes/Goals


      Expected Outcomes/Goals                    1. PO intake to continue to


                                                 meet >75% of nutritional needs


                                                 .


                                                 2. Monitor PO intake, wt,


                                                 nutrition related labs, and


                                                 skin integrity to trend WNL.


                                                 3. F/U as moderate risk in 3-5


                                                 days, 10/6-10/8.

## 2019-10-08 NOTE — INTERNAL MEDICINE PROG NOTE
Internal Medicine Subjective





- Subjective


Service Date: 10/08/19


Patient is:: awake, asleep, arousable, garfield chair, agitated, confused


Patient Complaints of:: other (Anxiety.)


Per staff patient has:: no adverse event, no episodes of fall, agitated, 

confused





Internal Medicine Objective





- Physical Exam


Vitals and I&O: 


 Vital Signs











Temp  98.2 F   10/08/19 14:53


 


Pulse  77   10/08/19 14:53


 


Resp  18   10/08/19 14:53


 


BP  101/60   10/08/19 14:53


 


Pulse Ox  97   10/08/19 14:53








 Intake & Output











 10/07/19 10/08/19 10/08/19





 18:59 06:59 18:59


 


Intake Total 960 60 


 


Balance 960 60 


 


Intake:   


 


  Oral 960 60 


 


Other:   


 


  # Voids 3 3 


 


  # Bowel Movements 1 0 











Active Medications: 


Current Medications





Acetaminophen (Tylenol)  650 mg PO Q4H PRN


   PRN Reason: Mild Pain/Headache/T above 101


   Stop: 19 14:21


   Last Admin: 10/05/19 20:48 Dose:  650 mg


Al Hydrox/Mg Hydrox/Simethicone (Maalox)  30 ml PO Q4HR PRN


   PRN Reason: FPR HEARTBURN


   Stop: 19 09:09


Aspirin (Aspirin)  325 mg PO DAILY [facility]


   Stop: 19 08:59


   Last Admin: 10/08/19 08:55 Dose:  325 mg


Atorvastatin Calcium (Lipitor)  80 mg PO HS [facility]; Protocol


   Stop: 19 20:59


   Last Admin: 10/07/19 21:04 Dose:  80 mg


Calamine/Phenol (Calmoseptine)  1 appl TP QID PRN


   PRN Reason: Skin Irritation


   Stop: 19 17:06


   Last Admin: 10/08/19 06:00 Dose:  1 appl


Clopidogrel Bisulfate (Plavix)  75 mg PO DAILY [facility]


   Stop: 19 08:59


   Last Admin: 10/08/19 08:55 Dose:  75 mg


Divalproex Sodium (Depakote Sprinkle)  250 mg PO HS [facility]; Protocol


   Stop: 19 20:59


   Last Admin: 10/07/19 21:04 Dose:  250 mg


Divalproex Sodium (Depakote Sprinkle)  250 mg PO QPM [facility]; Protocol


   Stop: 19 16:59


   Last Admin: 10/07/19 17:04 Dose:  250 mg


Docusate Sodium (Colace)  100 mg PO BID [facility]


   Stop: 19 16:59


   Last Admin: 10/08/19 08:55 Dose:  100 mg


Lorazepam (Ativan)  0.5 mg PO Q4H PRN; Protocol


   PRN Reason: Anxiety/Agitation


   Stop: 19 14:21


   Last Admin: 10/05/19 20:48 Dose:  0.5 mg


Magnesium Hydroxide (Milk Of Magnesia)  30 ml PO HS PRN


   PRN Reason: Constipation


   Stop: 19 14:21


Quetiapine Fumarate (Seroquel)  50 mg PO BID [facility]


   Stop: 19 08:59


   Last Admin: 10/08/19 08:55 Dose:  50 mg


Quetiapine Fumarate 100 mg/ (Quetiapine Fumarate 50 mg)  150 mg PO HS [facility]


   Stop: 19 20:59


Zolpidem Tartrate (Ambien)  5 mg PO HS PRN


   PRN Reason: Insomnia


   Stop: 19 14:21


   Last Admin: 10/08/19 00:38 Dose:  5 mg








General: weak, alert, disheveled


HEENT: NC/AT


Neck: Supple, No JVD


Lungs: other (no acute respiratory distress)


Cardiovascular: RRR


Abdomen: soft, non-tender, non-distended


Extremities: ecchymosis


Neurological: no change, unable to follow command (confused, demented)





Internal Medicine Assmt/Plan





- Assessment


Assessment: 





Dementia.


Schizoaffective disorder.


H/o CAD.


H/o Stroke.


H/o Hyperlipidemia.








- Plan


Plan: 








Continuation of care.


Monitor labs and vitals.


Monitor diet/nutritional support.


Psych management per Psych.


Continue present meds as directed.


Continue current treatment plan as ordered.





Nutritional Asmnt/Malnutr-PDOC





- Dietary Evaluation


Malnutrition Findings (Please click <Entered> for more info): 








Nutritional Asmnt/Malnutrition                             Start:  10/03/19 15:

45


Text:                                                      Status: Complete    

  


Freq:                                                                          

  


Protocol:                                                                      

  


 Document     10/03/19 15:55  CARLIN  (Rec: 10/03/19 16:00  [email or web address] [email or web address])


 Nutritional Asmnt/Malnutrition


     Patient General Information


      Nutritional Screening                      Low Risk


      Diagnosis                                  Psychosis


      Pertinent Medical Hx/Surgical Hx           TIA, CAD, Hyperlipidemia,


                                                 Schizoaffective Disorder,


                                                 Bipolar Type, Dementia


      Subjective Information                     Pt is a 69-year-old male


                                                 admitted on  on a 5150. Pt


                                                 is eating % of meals


                                                 Per Meal/Nutrition Activity


                                                 Record. Dietary is currently


                                                 providing an estimated 2370


                                                 kcals and 107gm Pro, per Pt


                                                 intake, this is providing an


                                                 estimated 2070 kcals and 94gm


                                                 Pro to meet 100% kcal and 100+


                                                 % Pro needs- adequate to meet


                                                 estimated nutritional needs.


                                                 Per wound care note (10/2), 


                                                 Right Lower Sacral and Buttock


                                                 area: Reopened scar tissue


                                                 from a wound of prior unknown


                                                 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


                                                 MASD. Open sites have 100% red


                                                 tissue. No odor, no drainage.


                                                 Yuli-wound intact.


                                                 Surrounding tissue has dark


                                                 discolored flaky skin and scar


                                                 tissue. Moisture barrier


                                                 cream nearly covered all of


                                                 the non-intact areas. Entire


                                                 site measures 13.0 cm x 15.0


                                                 cm (includes dark discolored


                                                 flaky skin, with a few


                                                 sporadic non-intact sites


                                                 within the dark discolored


                                                 flaky skin). Spoke with pt


                                                 Nurse, Colette, concerning wound


                                                 . Suggested either a


                                                 multivitamin or Prakash to


                                                 support wound healing, Colette


                                                 stated he would speak with MD


                                                 concerning a Multivitamin as


                                                 he did not believe the pt


                                                 would drink the Prakash.


                                                 HT: 57


                                                 WT: 155 LB (70.45 kg)


                                                 BMI: 24.28 (Normal)


                                                 GI: WNL, Soft, Non-tender


                                                 BM: 10/3 x1


                                                 I/O: 920/Not Noted


                                                 Skin: Area of Concern, ALIX Leg


                                                 abrasions with reddening, LT


                                                 hand bruise, Sacral/Buttock


                                                 open wound


                                                 Philip: 16


                                                 Diet Order: Pureed, ROBIN


                                                 Estimated Energy Needs: (


                                                 Geriatric, CBW)


                                                 0545-9469 kcals (25-30 kcals/


                                                 kg)


                                                 70-85g Pro (1.0-1.2 g/kg)


                                                 7004-8865 ml (25-30 ml/kg)


      Current Diet Order/ Nutrition Support      Pureed, ROBIN


      Pertinent Medications                      Maalox (PRN), Lipitor, Colace,


                                                 MOM (PRN)


      Pertinent Labs                             No Labs To Report.


     Nutritional Hx/Data


      Height                                     5 ft 7 in


      Height (Calculated Centimeters)            170.2


      Current Weight (lbs)                       155 lb


      Weight (Calculated Kilograms)              70.3


      Weight (Calculated Grams)                  29222.8


      Ideal Body Weight                          148 LB(67.27 kg)


      % Ideal Body Weight                        105


      Body Mass Index (BMI)                      24.3


      Weight Status                              Approriate


     GI Symptoms


      GI Symptoms                                None


      Last BM                                    10/3 x1


      Skin Integrity/Comment:                    Skin: Area of Concern, ALIX Leg


                                                 abrasions with reddening, LT


                                                 hand bruise, Sacral/Buttock


                                                 open wound


                                                 Philip: 16


      Current %PO                                Good (%)


     Estimated Nutritional Goals


      BEE in Kcals:                              Using Current wt


      Calories/Kcals/Kg                          25-30


      Kcals Calculated                           3522-1569


      Protein:                                   Using Current wt


      Protein g/k.0-1.2


      Protein Calculated                         70-85


      Fluid: ml                                  0904-0102 ml (25-30 ml/kg)


     Nutritional Problem


      1. Problem


       Problem                                   Increased vitamin/mineral


                                                 utilization


       Etiology                                  r/t wound healing


       Signs/Symptoms:                           aeb ALIX Leg abrasions with


                                                 reddening, LT hand bruise,


                                                 Sacral/Buttock open wound.


     Intervention/Recommendation


      Comments                                   1. Continue with Pureed, ROBIN


                                                 diet as ordered.


                                                 2. Recommend addition of


                                                 multivitamin to support wound


                                                 healing and skin integrity.


     Expected Outcomes/Goals


      Expected Outcomes/Goals                    1. PO intake to continue to


                                                 meet >75% of nutritional needs


                                                 .


                                                 2. Monitor PO intake, wt,


                                                 nutrition related labs, and


                                                 skin integrity to trend WNL.


                                                 3. F/U as moderate risk in 3-5


                                                 days, 10/6-10/8.

## 2019-10-08 NOTE — PROGRESS NOTES
DATE:  10/08/2019



SUBJECTIVE:  Chart reviewed and the patient interviewed.  Also discussed the

patient's condition with the staff and reviewed records and labs.  The patient

is still angry and in irritable mood.  The patient woke up in the middle of the

night, tried to get off the bed and yelling and screaming and irritable and

difficulty following directions.  The patient is still easily agitated and

confused, not cooperative with the staff.  Also had episodes of yelling and

screaming.  Otherwise, no side effects of medications.



ASSESSMENT:  The patient is still irritable and agitated and needs close

monitoring.



TREATMENT PLAN:  Continue to monitor behavior and condition closely.  Also,

continue to work on his anger and inability to follow directions.  At the same

time, we will increase his Seroquel to 50 mg twice a day and 150 mg at bedtime

and we will continue to follow up.





DD: 10/08/2019 06:06

DT: 10/08/2019 21:38

[facility]# [id number]  [id number]

## 2019-10-09 NOTE — INTERNAL MEDICINE PROG NOTE
Internal Medicine Subjective





- Subjective


Service Date: 10/09/19


Patient seen and examined:: with staff


Patient is:: awake, asleep, arousable, garfield chair, agitated, confused


Patient Complaints of:: other (Anxiety.)


Per staff patient has:: no adverse event, no episodes of fall, agitated, 

confused





Internal Medicine Objective





- Physical Exam


Vitals and I&O: 


 Vital Signs











Temp  97.4 F   10/09/19 06:09


 


Pulse  65   10/09/19 06:09


 


Resp  19   10/09/19 06:09


 


BP  133/80   10/09/19 06:09


 


Pulse Ox  96   10/09/19 06:09








 Intake & Output











 10/08/19 10/09/19 10/09/19





 18:59 06:59 18:59


 


Intake Total 700 180 


 


Balance 700 180 


 


Intake:   


 


  Oral 700 180 


 


Other:   


 


  # Voids 3 2 


 


  # Bowel Movements 0 0 











Active Medications: 


Current Medications





Acetaminophen (Tylenol)  650 mg PO Q4H PRN


   PRN Reason: Mild Pain/Headache/T above 101


   Stop: 19 14:21


   Last Admin: 10/05/19 20:48 Dose:  650 mg


Al Hydrox/Mg Hydrox/Simethicone (Maalox)  30 ml PO Q4HR PRN


   PRN Reason: FPR HEARTBURN


   Stop: 19 09:09


Aspirin (Aspirin)  325 mg PO DAILY [facility]


   Stop: 19 08:59


   Last Admin: 10/09/19 09:23 Dose:  Not Given


Atorvastatin Calcium (Lipitor)  80 mg PO [facility]; Protocol


   Stop: 19 20:59


   Last Admin: 10/08/19 20:47 Dose:  80 mg


Calamine/Phenol (Calmoseptine)  1 appl TP QID PRN


   PRN Reason: Skin Irritation


   Stop: 19 17:06


   Last Admin: 10/08/19 21:30 Dose:  1 appl


Clopidogrel Bisulfate (Plavix)  75 mg PO DAILY [facility]


   Stop: 19 08:59


   Last Admin: 10/09/19 09:23 Dose:  Not Given


Divalproex Sodium (Depakote Sprinkle)  250 mg PO DAILY [facility]; Protocol


   Stop: 19 08:59


   Last Admin: 10/09/19 09:22 Dose:  Not Given


Divalproex Sodium (Depakote Sprinkle)  500 mg PO HS [facility]; Protocol


   Stop: 19 20:59


Docusate Sodium (Colace)  100 mg PO BID [facility]


   Stop: 19 16:59


   Last Admin: 10/09/19 09:23 Dose:  Not Given


Lorazepam (Ativan)  0.5 mg PO Q4H PRN; Protocol


   PRN Reason: Anxiety/Agitation


   Stop: 19 14:21


   Last Admin: 10/08/19 20:47 Dose:  0.5 mg


Magnesium Hydroxide (Milk Of Magnesia)  30 ml PO HS PRN


   PRN Reason: Constipation


   Stop: 19 14:21


Quetiapine Fumarate (Seroquel)  50 mg PO BID MARY


   Stop: 19 08:59


   Last Admin: 10/09/19 09:23 Dose:  Not Given


Quetiapine Fumarate 100 mg/ (Quetiapine Fumarate 50 mg)  150 mg PO HS MARY


   Stop: 19 20:59


   Last Admin: 10/08/19 20:49 Dose:  150 mg


Zolpidem Tartrate (Ambien)  5 mg PO HS PRN


   PRN Reason: Insomnia


   Stop: 19 14:21


   Last Admin: 10/08/19 21:30 Dose:  5 mg








Physical Exam: 


Patient has weakness and is irritable.


General: weak, alert, disheveled


HEENT: NC/AT


Neck: Supple, No JVD


Lungs: other (no acute respiratory distress)


Cardiovascular: RRR


Abdomen: soft, non-tender, non-distended


Extremities: ecchymosis


Neurological: no change, unable to follow command (confused, demented)





Internal Medicine Assmt/Plan





- Assessment


Assessment: 


Insomnia.


Acute psychosis.


Dementia.


Schizoaffective disorder.


H/o CAD.


H/o Stroke.


H/o Hyperlipidemia.





- Plan


Plan: 


Continuation of care.


Monitor labs and vitals.


Monitor diet/nutritional support.


Psych management per Psych.


Continue present meds as directed.


Continue present care management.





Nutritional Asmnt/Malnutr-PDOC





- Dietary Evaluation


Malnutrition Findings (Please click <Entered> for more info): 








Nutritional Asmnt/Malnutrition                             Start:  10/03/19 15:

45


Text:                                                      Status: Complete    

  


Freq:                                                                          

  


Protocol:                                                                      

  


 Document     10/03/19 15:55  CARLIN  (Rec: 10/03/19 16:00  CARLIN [id number])


 Nutritional Asmnt/Malnutrition


     Patient General Information


      Nutritional Screening                      Low Risk


      Diagnosis                                  Psychosis


      Pertinent Medical Hx/Surgical Hx           TIA, CAD, Hyperlipidemia,


                                                 Schizoaffective Disorder,


                                                 Bipolar Type, Dementia


      Subjective Information                     Pt is a 69-year-old male


                                                 admitted on  on a 5150. Pt


                                                 is eating % of meals


                                                 Per Meal/Nutrition Activity


                                                 Record. Dietary is currently


                                                 providing an estimated 2370


                                                 kcals and 107gm Pro, per Pt


                                                 intake, this is providing an


                                                 estimated 2070 kcals and 94gm


                                                 Pro to meet 100% kcal and 100+


                                                 % Pro needs- adequate to meet


                                                 estimated nutritional needs.


                                                 Per wound care note (10/2), 


                                                 Right Lower Sacral and Buttock


                                                 area: Reopened scar tissue


                                                 from a wound of prior unknown


                                                 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


                                                 MASD. Open sites have 100% red


                                                 tissue. No odor, no drainage.


                                                 Yuli-wound intact.


                                                 Surrounding tissue has dark


                                                 discolored flaky skin and scar


                                                 tissue. Moisture barrier


                                                 cream nearly covered all of


                                                 the non-intact areas. Entire


                                                 site measures 13.0 cm x 15.0


                                                 cm (includes dark discolored


                                                 flaky skin, with a few


                                                 sporadic non-intact sites


                                                 within the dark discolored


                                                 flaky skin). Spoke with pt


                                                 Nurse, Colette, concerning wound


                                                 . Suggested either a


                                                 multivitamin or Prakash to


                                                 support wound healing, Colette


                                                 stated he would speak with MD


                                                 concerning a Multivitamin as


                                                 he did not believe the pt


                                                 would drink the Prakash.


                                                 HT: 57


                                                 WT: 155 LB (70.45 kg)


                                                 BMI: 24.28 (Normal)


                                                 GI: WNL, Soft, Non-tender


                                                 BM: 10/3 x1


                                                 I/O: 920/Not Noted


                                                 Skin: Area of Concern, ALIX Leg


                                                 abrasions with reddening, LT


                                                 hand bruise, Sacral/Buttock


                                                 open wound


                                                 Philip: 16


                                                 Diet Order: Pureed, ROBIN


                                                 Estimated Energy Needs: (


                                                 Geriatric, CBW)


                                                 3646-0739 kcals (25-30 kcals/


                                                 kg)


                                                 70-85g Pro (1.0-1.2 g/kg)


                                                 9953-2634 ml (25-30 ml/kg)


      Current Diet Order/ Nutrition Support      Pureed, ROBIN


      Pertinent Medications                      Maalox (PRN), Lipitor, Colace,


                                                 MOM (PRN)


      Pertinent Labs                             No Labs To Report.


     Nutritional Hx/Data


      Height                                     1.7 m


      Height (Calculated Centimeters)            170.2


      Current Weight (lbs)                       70.307 kg


      Weight (Calculated Kilograms)              70.3


      Weight (Calculated Grams)                  79140.8


      Ideal Body Weight                          148 LB(67.27 kg)


      % Ideal Body Weight                        105


      Body Mass Index (BMI)                      24.3


      Weight Status                              Approriate


     GI Symptoms


      GI Symptoms                                None


      Last BM                                    10/3 x1


      Skin Integrity/Comment:                    Skin: Area of Concern, ALIX Leg


                                                 abrasions with reddening, LT


                                                 hand bruise, Sacral/Buttock


                                                 open wound


                                                 Philip: 16


      Current %PO                                Good (%)


     Estimated Nutritional Goals


      BEE in Kcals:                              Using Current wt


      Calories/Kcals/Kg                          25-30


      Kcals Calculated                           6026-9376


      Protein:                                   Using Current wt


      Protein g/k.0-1.2


      Protein Calculated                         70-85


      Fluid: ml                                  8302-7623 ml (25-30 ml/kg)


     Nutritional Problem


      1. Problem


       Problem                                   Increased vitamin/mineral


                                                 utilization


       Etiology                                  r/t wound healing


       Signs/Symptoms:                           aeb ALIX Leg abrasions with


                                                 reddening, LT hand bruise,


                                                 Sacral/Buttock open wound.


     Intervention/Recommendation


      Comments                                   1. Continue with Pureed, ROBIN


                                                 diet as ordered.


                                                 2. Recommend addition of


                                                 multivitamin to support wound


                                                 healing and skin integrity.


     Expected Outcomes/Goals


      Expected Outcomes/Goals                    1. PO intake to continue to


                                                 meet >75% of nutritional needs


                                                 .


                                                 2. Monitor PO intake, wt,


                                                 nutrition related labs, and


                                                 skin integrity to trend WNL.


                                                 3. F/U as moderate risk in 3-5


                                                 days, 10/6-10/8.

## 2019-10-10 ENCOUNTER — HOSPITAL ENCOUNTER (EMERGENCY)
Dept: HOSPITAL 4 - SED | Age: 69
Discharge: PSYCHIATRIC OR SUBSTANCE USE FACILITY | End: 2019-10-10
Payer: COMMERCIAL

## 2019-10-10 VITALS — SYSTOLIC BLOOD PRESSURE: 107 MMHG

## 2019-10-10 VITALS — HEIGHT: 66 IN | WEIGHT: 130 LBS | BODY MASS INDEX: 20.89 KG/M2

## 2019-10-10 VITALS — SYSTOLIC BLOOD PRESSURE: 105 MMHG

## 2019-10-10 DIAGNOSIS — R94.31: ICD-10-CM

## 2019-10-10 DIAGNOSIS — Z04.6: Primary | ICD-10-CM

## 2019-10-10 LAB
ALBUMIN SERPL BCP-MCNC: 3.6 G/DL (ref 3.4–4.8)
ALT SERPL W P-5'-P-CCNC: 76 U/L (ref 12–78)
ANION GAP SERPL CALCULATED.3IONS-SCNC: 5 MMOL/L (ref 5–15)
APPEARANCE UR: CLEAR
AST SERPL W P-5'-P-CCNC: 46 U/L (ref 10–37)
BASOPHILS # BLD AUTO: 0 K/UL (ref 0–0.2)
BASOPHILS NFR BLD AUTO: 0.5 % (ref 0–2)
BILIRUB SERPL-MCNC: 1.3 MG/DL (ref 0–1)
BILIRUB UR QL STRIP: (no result)
BUN SERPL-MCNC: 23 MG/DL (ref 8–21)
CALCIUM SERPL-MCNC: 9.2 MG/DL (ref 8.4–11)
CHLORIDE SERPL-SCNC: 104 MMOL/L (ref 98–107)
COLOR UR: YELLOW
CREAT SERPL-MCNC: 1.13 MG/DL (ref 0.55–1.3)
EOSINOPHIL # BLD AUTO: 0 K/UL (ref 0–0.4)
EOSINOPHIL NFR BLD AUTO: 0.4 % (ref 0–4)
ERYTHROCYTE [DISTWIDTH] IN BLOOD BY AUTOMATED COUNT: 14 % (ref 9–15)
GFR SERPL CREATININE-BSD FRML MDRD: 83 ML/MIN (ref 90–?)
GLUCOSE SERPL-MCNC: 116 MG/DL (ref 70–99)
GLUCOSE UR STRIP-MCNC: NEGATIVE MG/DL
HCT VFR BLD AUTO: 44.5 % (ref 36–54)
HGB BLD-MCNC: 15.3 G/DL (ref 14–18)
HGB UR QL STRIP: NEGATIVE
KETONES UR STRIP-MCNC: NEGATIVE MG/DL
LEUKOCYTE ESTERASE UR QL STRIP: NEGATIVE
LYMPHOCYTES # BLD AUTO: 2.2 K/UL (ref 1–5.5)
LYMPHOCYTES NFR BLD AUTO: 33.2 % (ref 20.5–51.5)
MCH RBC QN AUTO: 33 PG (ref 27–31)
MCHC RBC AUTO-ENTMCNC: 34 % (ref 32–36)
MCV RBC AUTO: 95 FL (ref 79–98)
MONOCYTES # BLD MANUAL: 0.8 K/UL (ref 0–1)
MONOCYTES # BLD MANUAL: 12.2 % (ref 1.7–9.3)
NEUTROPHILS # BLD AUTO: 3.5 K/UL (ref 1.8–7.7)
NEUTROPHILS NFR BLD AUTO: 53.7 % (ref 40–70)
NITRITE UR QL STRIP: NEGATIVE
PH UR STRIP: 6 [PH] (ref 5–8)
PLATELET # BLD AUTO: 160 K/UL (ref 130–430)
POTASSIUM SERPL-SCNC: 4.2 MMOL/L (ref 3.5–5.1)
PROT UR QL STRIP: NEGATIVE
RBC # BLD AUTO: 4.67 MIL/UL (ref 4.2–6.2)
SODIUM SERPLBLD-SCNC: 141 MMOL/L (ref 136–145)
SP GR UR STRIP: 1.02 (ref 1–1.03)
UROBILINOGEN UR STRIP-MCNC: 4 MG/DL (ref 0.2–1)
WBC # BLD AUTO: 6.6 K/UL (ref 4.8–10.8)

## 2019-10-10 NOTE — PROGRESS NOTES
DATE:  10/09/2019



SUBJECTIVE:  Chart reviewed and the patient interviewed.  Also discussed the

patient's condition with the staff and reviewed records and labs.  The patient

continues to have severe mood swings and he is still severely irritable and

angry.  The patient also is still preoccupied and uncooperative with the staff,

especially during helping him with his ADLs.  The patient also is guarded.  The

patient also is having episodes of anger and irritability.  Otherwise, the

patient is compliant with taking his medications with no side effects of

medications.



ASSESSMENT:  The patient is still extremely agitated and is still severely

irritable and needs close monitoring.



TREATMENT PLAN:  Continue to monitor behavior and condition closely.  Also, we

will increase Depakote to 250 mg in the morning and 500 mg at bedtime and we

will monitor Depakote blood level.  Also, continue Seroquel 50 mg twice a day

and Ativan p.r.n.





DD: 10/09/2019 08:08

DT: 10/09/2019 22:39

[facility]# [id number]  [id number]

## 2019-10-10 NOTE — NUR
Pt BIB BLS from SNF for medical clearance. According to EMS pt has been having 
poor intake and refusing PO medications. Pt is confused and attempting to get 
out of bed. Denies chest pain, SOB, N/V/D or any other symptoms at this time. 
Will continue to monitor.

## 2019-10-10 NOTE — NUR
Placed in room 7  . Placed on cardiac monitor, blood pressure machine and pulse 
oximeter. To gown for exam. Side rails up.

Report given to Gabe RAMIREZ.

## 2019-10-10 NOTE — NUR
Patient to be transferred to [facility]  Is being transferred due to higher 
level of care.  Receiving facility has accepting physician and available space. 
ER physician has signed transfer form.  Patient or responsible party has agreed 
to transfer and signed form.  Patient belongings inventoried and will be sent 
with patient.  Copy of nursing notes, lab reports, EKG, Physicians Orders and 
X-rays to be sent with patient.  Report called to Esdras RAMIREZ at receiving 
facility. Receiving physician is Dr. Morelos. [facility] ambulance service has been 
called for transfer.  ETA is 30.

## 2019-10-10 NOTE — PROGRESS NOTES
DATE:  10/10/2019



SUBJECTIVE:  Chart reviewed and the patient interviewed.  Also discussed the

patient's condition with the staff and reviewed records and labs.  The patient

is still agitated and in irritable and angry mood.  The patient also is still

suspicious and paranoid and refused to take his medications sometimes.  Also,

yesterday refused to eat all day according to the staff.  He is still confused

and angry and in irritable mood and easily agitated with difficulty following

any directions.



ASSESSMENT:  The patient is still agitated and is still aggressive.



TREATMENT PLAN:  Continue to monitor behavior and condition closely.  Also,

continue adjusting medications and I changed his medications yesterday.  Also,

continue to work on his poor impulse control and followup.





DD: 10/10/2019 07:43

DT: 10/10/2019 23:17

[facility]# [id number]  [id number]

## 2019-10-10 NOTE — NUR
# 16 FR In and Out catheter with use of sterile technique. Immediate return of 
100 ml urine noted. Urine sample collected and sent to lab. Pt tolerated 
procedure well. Patient unable to toilet self.

## 2019-10-10 NOTE — INTERNAL MEDICINE PROG NOTE
Internal Medicine Subjective





- Subjective


Service Date: 10/10/19


Patient is:: awake, asleep, arousable, garfield chair, agitated, confused


Patient Complaints of:: other (Anxiety.)


Per staff patient has:: no adverse event, no episodes of fall, agitated, 

confused





Internal Medicine Objective





- Physical Exam


Vitals and I&O: 


 Vital Signs











Temp  97.2 F   10/10/19 14:00


 


Pulse  56   10/10/19 14:00


 


Resp  20   10/10/19 14:00


 


BP  94/75   10/10/19 14:00


 


Pulse Ox  96   10/10/19 14:00








 Intake & Output











 10/09/19 10/10/19 10/10/19





 18:59 06:59 18:59


 


Intake Total 120 120 


 


Balance 120 120 


 


Intake:   


 


  Oral 120 120 


 


Other:   


 


  # Voids 1 3 


 


  # Bowel Movements 0  











Active Medications: 


Current Medications





Acetaminophen (Tylenol)  650 mg PO Q4H PRN


   PRN Reason: Mild Pain/Headache/T above 101


   Stop: 19 14:21


   Last Admin: 10/05/19 20:48 Dose:  650 mg


Al Hydrox/Mg Hydrox/Simethicone (Maalox)  30 ml PO Q4HR PRN


   PRN Reason: FPR HEARTBURN


   Stop: 19 09:09


Aspirin (Aspirin)  325 mg PO DAILY MARY


   Stop: 19 08:59


   Last Admin: 10/10/19 09:27 Dose:  Not Given


Atorvastatin Calcium (Lipitor)  80 mg PO HS MARY; Protocol


   Stop: 19 20:59


   Last Admin: 10/09/19 20:36 Dose:  80 mg


Calamine/Phenol (Calmoseptine)  1 appl TP QID PRN


   PRN Reason: Skin Irritation


   Stop: 19 17:06


   Last Admin: 10/08/19 21:30 Dose:  1 appl


Clopidogrel Bisulfate (Plavix)  75 mg PO DAILY MARY


   Stop: 19 08:59


   Last Admin: 10/10/19 09:26 Dose:  Not Given


Divalproex Sodium (Depakote Sprinkle)  250 mg PO DAILY MARY; Protocol


   Stop: 19 08:59


   Last Admin: 10/10/19 09:27 Dose:  Not Given


Divalproex Sodium (Depakote Sprinkle)  500 mg PO HS MARY; Protocol


   Stop: 19 20:59


   Last Admin: 10/09/19 20:36 Dose:  500 mg


Docusate Sodium (Colace)  100 mg PO BID MARY


   Stop: 19 16:59


   Last Admin: 10/10/19 16:18 Dose:  Not Given


Lorazepam (Ativan)  0.5 mg PO Q4H PRN; Protocol


   PRN Reason: Anxiety/Agitation


   Stop: 19 14:21


   Last Admin: 10/08/19 20:47 Dose:  0.5 mg


Magnesium Hydroxide (Milk Of Magnesia)  30 ml PO HS PRN


   PRN Reason: Constipation


   Stop: 19 14:21


Quetiapine Fumarate (Seroquel)  50 mg PO BID MARY


   Stop: 19 08:59


   Last Admin: 10/10/19 16:18 Dose:  Not Given


Quetiapine Fumarate 100 mg/ (Quetiapine Fumarate 50 mg)  150 mg PO HS MARY


   Stop: 19 20:59


   Last Admin: 10/09/19 20:35 Dose:  150 mg


Zolpidem Tartrate (Ambien)  5 mg PO HS PRN


   PRN Reason: Insomnia


   Stop: 19 14:21


   Last Admin: 10/08/19 21:30 Dose:  5 mg








General: weak, alert, disheveled


HEENT: NC/AT


Neck: Supple, No JVD


Lungs: other (no acute respiratory distress)


Cardiovascular: RRR


Abdomen: soft, non-tender, non-distended


Extremities: ecchymosis


Neurological: no change, unable to follow command (confused, demented)





Internal Medicine Assmt/Plan





- Assessment


Assessment: 





Dementia.


Schizoaffective disorder.


H/o CAD.


H/o Stroke.


H/o Hyperlipidemia.








- Plan


Plan: 








Continuation of care.


Monitor labs and vitals.


Monitor diet/nutritional support.


Psych management per Psych.


Continue present meds as directed.


Continue current treatment plan as ordered.





Nutritional Asmnt/Malnutr-PDOC





- Dietary Evaluation


Malnutrition Findings (Please click <Entered> for more info): 








Nutritional Asmnt/Malnutrition                             Start:  10/03/19 15:

45


Text:                                                      Status: Complete    

  


Freq:                                                                          

  


Protocol:                                                                      

  


 Document     10/03/19 15:55  CARLIN  (Rec: 10/03/19 16:00  CARLIN [id number])


 Nutritional Asmnt/Malnutrition


     Patient General Information


      Nutritional Screening                      Low Risk


      Diagnosis                                  Psychosis


      Pertinent Medical Hx/Surgical Hx           TIA, CAD, Hyperlipidemia,


                                                 Schizoaffective Disorder,


                                                 Bipolar Type, Dementia


      Subjective Information                     Pt is a 69-year-old male


                                                 admitted on  on a 5150. Pt


                                                 is eating % of meals


                                                 Per Meal/Nutrition Activity


                                                 Record. Dietary is currently


                                                 providing an estimated 2370


                                                 kcals and 107gm Pro, per Pt


                                                 intake, this is providing an


                                                 estimated 2070 kcals and 94gm


                                                 Pro to meet 100% kcal and 100+


                                                 % Pro needs- adequate to meet


                                                 estimated nutritional needs.


                                                 Per wound care note (10/2), 


                                                 Right Lower Sacral and Buttock


                                                 area: Reopened scar tissue


                                                 from a wound of prior unknown


                                                 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


                                                 MASD. Open sites have 100% red


                                                 tissue. No odor, no drainage.


                                                 Yuli-wound intact.


                                                 Surrounding tissue has dark


                                                 discolored flaky skin and scar


                                                 tissue. Moisture barrier


                                                 cream nearly covered all of


                                                 the non-intact areas. Entire


                                                 site measures 13.0 cm x 15.0


                                                 cm (includes dark discolored


                                                 flaky skin, with a few


                                                 sporadic non-intact sites


                                                 within the dark discolored


                                                 flaky skin). Spoke with pt


                                                 Nurse, Colette, concerning wound


                                                 . Suggested either a


                                                 multivitamin or Prakash to


                                                 support wound healing, Colette


                                                 stated he would speak with MD


                                                 concerning a Multivitamin as


                                                 he did not believe the pt


                                                 would drink the Prakash.


                                                 HT: 57


                                                 WT: 155 LB (70.45 kg)


                                                 BMI: 24.28 (Normal)


                                                 GI: WNL, Soft, Non-tender


                                                 BM: 10/3 x1


                                                 I/O: 920/Not Noted


                                                 Skin: Area of Concern, ALIX Leg


                                                 abrasions with reddening, LT


                                                 hand bruise, Sacral/Buttock


                                                 open wound


                                                 Philip: 16


                                                 Diet Order: Pureed, ROBIN


                                                 Estimated Energy Needs: (


                                                 Geriatric, CBW)


                                                 5882-7559 kcals (25-30 kcals/


                                                 kg)


                                                 70-85g Pro (1.0-1.2 g/kg)


                                                 2874-5579 ml (25-30 ml/kg)


      Current Diet Order/ Nutrition Support      Pureed, ROBIN


      Pertinent Medications                      Maalox (PRN), Lipitor, Colace,


                                                 MOM (PRN)


      Pertinent Labs                             No Labs To Report.


     Nutritional Hx/Data


      Height                                     5 ft 7 in


      Height (Calculated Centimeters)            170.2


      Current Weight (lbs)                       155 lb


      Weight (Calculated Kilograms)              70.3


      Weight (Calculated Grams)                  35572.8


      Ideal Body Weight                          148 LB(67.27 kg)


      % Ideal Body Weight                        105


      Body Mass Index (BMI)                      24.3


      Weight Status                              Approriate


     GI Symptoms


      GI Symptoms                                None


      Last BM                                    10/3 x1


      Skin Integrity/Comment:                    Skin: Area of Concern, ALIX Leg


                                                 abrasions with reddening, LT


                                                 hand bruise, Sacral/Buttock


                                                 open wound


                                                 Philip: 16


      Current %PO                                Good (%)


     Estimated Nutritional Goals


      BEE in Kcals:                              Using Current wt


      Calories/Kcals/Kg                          25-30


      Kcals Calculated                           8221-2449


      Protein:                                   Using Current wt


      Protein g/k.0-1.2


      Protein Calculated                         70-85


      Fluid: ml                                  1312-3264 ml (25-30 ml/kg)


     Nutritional Problem


      1. Problem


       Problem                                   Increased vitamin/mineral


                                                 utilization


       Etiology                                  r/t wound healing


       Signs/Symptoms:                           aeb ALIX Leg abrasions with


                                                 reddening, LT hand bruise,


                                                 Sacral/Buttock open wound.


     Intervention/Recommendation


      Comments                                   1. Continue with Pureed, ROBIN


                                                 diet as ordered.


                                                 2. Recommend addition of


                                                 multivitamin to support wound


                                                 healing and skin integrity.


     Expected Outcomes/Goals


      Expected Outcomes/Goals                    1. PO intake to continue to


                                                 meet >75% of nutritional needs


                                                 .


                                                 2. Monitor PO intake, wt,


                                                 nutrition related labs, and


                                                 skin integrity to trend WNL.


                                                 3. F/U as moderate risk in 3-5


                                                 days, 10/6-10/8.

## 2019-10-11 NOTE — INTERNAL MEDICINE PROG NOTE
Internal Medicine Subjective





- Subjective


Service Date: 10/11/19


Patient seen and examined:: with staff


Patient is:: awake, asleep, arousable, garfield chair, agitated, confused


Patient Complaints of:: other (Anxiety.)


Per staff patient has:: no adverse event, no episodes of fall, agitated, 

confused





Internal Medicine Objective





- Physical Exam


Vitals and I&O: 


 Vital Signs











Temp  97.2 F   10/11/19 06:46


 


Pulse  68   10/11/19 06:46


 


Resp  18   10/11/19 06:46


 


BP  107/67   10/11/19 06:46


 


Pulse Ox  97   10/11/19 06:46








 Intake & Output











 10/10/19 10/11/19 10/11/19





 18:59 06:59 18:59


 


Intake Total 0  


 


Balance 0  


 


Intake:   


 


  Oral 0  


 


Other:   


 


  # Voids 2 2 


 


  # Bowel Movements 0  











Active Medications: 


Current Medications





Acetaminophen (Tylenol)  650 mg PO Q4H PRN


   PRN Reason: Pain or Fever >101


   Stop: 12/10/19 01:50


Al Hydrox/Mg Hydrox/Simethicone (Maalox)  30 ml PO Q4HR PRN


   PRN Reason: GI DISTRESS


   Stop: 12/10/19 01:53


Aspirin (Aspirin)  325 mg PO DAILY Pending sale to [facility]


   Stop: 12/10/19 08:59


   Last Admin: 10/11/19 08:29 Dose:  325 mg


Atorvastatin Calcium (Lipitor)  80 mg PO DAILY Pending sale to [facility]; Protocol


   Stop: 12/10/19 08:59


   Last Admin: 10/11/19 08:29 Dose:  80 mg


Calamine/Phenol (Calmoseptine)  1 appl TP QID PRN


   PRN Reason: skin irritation, reddenned are


   Stop: 12/10/19 08:59


Clopidogrel Bisulfate (Plavix)  75 mg PO DAILY Pending sale to [facility]


   Stop: 12/10/19 08:59


   Last Admin: 10/11/19 08:29 Dose:  75 mg


Divalproex Sodium (Depakote Sprinkle)  500 mg PO HS MARY; Protocol


   Stop: 12/10/19 20:59


Divalproex Sodium (Depakote Sprinkle)  250 mg PO DAILY Pending sale to [facility]; Protocol


   Stop: 12/10/19 08:59


   Last Admin: 10/11/19 08:36 Dose:  250 mg


Docusate Sodium (Colace)  100 mg PO BID Pending sale to [facility]


   Stop: 12/10/19 08:59


   Last Admin: 10/11/19 08:29 Dose:  100 mg


Lorazepam (Ativan)  0.5 mg PO Q4HR PRN; Protocol


   PRN Reason: anxiety and agitation


   Stop: 12/10/19 02:10


Magnesium Hydroxide (Milk Of Magnesia)  30 ml PO HS PRN


   PRN Reason: Constipation


   Stop: 12/10/19 02:12


Quetiapine Fumarate (Seroquel)  50 mg PO BID MARY; Protocol


   Stop: 12/10/19 08:59


   Last Admin: 10/11/19 08:39 Dose:  50 mg


Quetiapine Fumarate 100 mg/ (Quetiapine Fumarate 50 mg)  150 mg PO HS MARY


   Stop: 12/10/19 20:59


Zolpidem Tartrate (Ambien)  5 mg PO HS PRN


   PRN Reason: Insomnia


   Stop: 12/10/19 02:19








Physical Exam: 





Patient is awake, anxious and irritable, no new complaints.


General: weak, alert, disheveled


HEENT: NC/AT


Neck: Supple, No JVD


Lungs: other (no acute respiratory distress)


Cardiovascular: RRR


Abdomen: soft, non-tender, non-distended


Extremities: ecchymosis


Neurological: no change, unable to follow command (confused, demented)





Internal Medicine Assmt/Plan





- Assessment


Assessment: 





Insomnia.


Acute psychosis.


Dementia.


Schizoaffective disorder.


H/o CAD.


H/o Stroke.


H/o Hyperlipidemia.





- Plan


Plan: 





Continuation of care.


Monitor labs and vitals.


Monitor diet/nutritional support.


Psych management per Psych.


Continue present meds as directed.


Continue present care management.





Nutritional Asmnt/Malnutr-PDOC





- Dietary Evaluation


Malnutrition Findings (Please click <Entered> for more info): 








Nutritional Asmnt/Malnutrition                             Start:  10/03/19 15:

45


Text:                                                      Status: Complete    

  


Freq:                                                                          

  


Protocol:                                                                      

  


 Document     10/03/19 15:55  CARLIN  (Rec: 10/03/19 16:00  CARLIN [id number])


 Nutritional Asmnt/Malnutrition


     Patient General Information


      Nutritional Screening                      Low Risk


      Diagnosis                                  Psychosis


      Pertinent Medical Hx/Surgical Hx           TIA, CAD, Hyperlipidemia,


                                                 Schizoaffective Disorder,


                                                 Bipolar Type, Dementia


      Subjective Information                     Pt is a 69-year-old male


                                                 admitted on  on a 5150. Pt


                                                 is eating % of meals


                                                 Per Meal/Nutrition Activity


                                                 Record. Dietary is currently


                                                 providing an estimated 2370


                                                 kcals and 107gm Pro, per Pt


                                                 intake, this is providing an


                                                 estimated 2070 kcals and 94gm


                                                 Pro to meet 100% kcal and 100+


                                                 % Pro needs- adequate to meet


                                                 estimated nutritional needs.


                                                 Per wound care note (10/2), 


                                                 Right Lower Sacral and Buttock


                                                 area: Reopened scar tissue


                                                 from a wound of prior unknown


                                                 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


                                                 MASD. Open sites have 100% red


                                                 tissue. No odor, no drainage.


                                                 Yuli-wound intact.


                                                 Surrounding tissue has dark


                                                 discolored flaky skin and scar


                                                 tissue. Moisture barrier


                                                 cream nearly covered all of


                                                 the non-intact areas. Entire


                                                 site measures 13.0 cm x 15.0


                                                 cm (includes dark discolored


                                                 flaky skin, with a few


                                                 sporadic non-intact sites


                                                 within the dark discolored


                                                 flaky skin). Spoke with pt


                                                 Nurse, Colette, concerning wound


                                                 . Suggested either a


                                                 multivitamin or Prakash to


                                                 support wound healing, Colette


                                                 stated he would speak with MD


                                                 concerning a Multivitamin as


                                                 he did not believe the pt


                                                 would drink the Prakash.


                                                 HT: 57


                                                 WT: 155 LB (70.45 kg)


                                                 BMI: 24.28 (Normal)


                                                 GI: WNL, Soft, Non-tender


                                                 BM: 10/3 x1


                                                 I/O: 920/Not Noted


                                                 Skin: Area of Concern, ALIX Leg


                                                 abrasions with reddening, LT


                                                 hand bruise, Sacral/Buttock


                                                 open wound


                                                 Philip: 16


                                                 Diet Order: Pureed, ROBIN


                                                 Estimated Energy Needs: (


                                                 Geriatric, CBW)


                                                 0280-4699 kcals (25-30 kcals/


                                                 kg)


                                                 70-85g Pro (1.0-1.2 g/kg)


                                                 9582-8484 ml (25-30 ml/kg)


      Current Diet Order/ Nutrition Support      Pureed, ROBIN


      Pertinent Medications                      Maalox (PRN), Lipitor, Colace,


                                                 MOM (PRN)


      Pertinent Labs                             No Labs To Report.


     Nutritional Hx/Data


      Height                                     1.7 m


      Height (Calculated Centimeters)            170.2


      Current Weight (lbs)                       70.307 kg


      Weight (Calculated Kilograms)              70.3


      Weight (Calculated Grams)                  86274.8


      Ideal Body Weight                          148 LB(67.27 kg)


      % Ideal Body Weight                        105


      Body Mass Index (BMI)                      24.3


      Weight Status                              Approriate


     GI Symptoms


      GI Symptoms                                None


      Last BM                                    10/3 x1


      Skin Integrity/Comment:                    Skin: Area of Concern, ALIX Leg


                                                 abrasions with reddening, LT


                                                 hand bruise, Sacral/Buttock


                                                 open wound


                                                 Philip: 16


      Current %PO                                Good (%)


     Estimated Nutritional Goals


      BEE in Kcals:                              Using Current wt


      Calories/Kcals/Kg                          25-30


      Kcals Calculated                           4199-0027


      Protein:                                   Using Current wt


      Protein g/k.0-1.2


      Protein Calculated                         70-85


      Fluid: ml                                  2802-0802 ml (25-30 ml/kg)


     Nutritional Problem


      1. Problem


       Problem                                   Increased vitamin/mineral


                                                 utilization


       Etiology                                  r/t wound healing


       Signs/Symptoms:                           aeb ALIX Leg abrasions with


                                                 reddening, LT hand bruise,


                                                 Sacral/Buttock open wound.


     Intervention/Recommendation


      Comments                                   1. Continue with Pureed, ROBIN


                                                 diet as ordered.


                                                 2. Recommend addition of


                                                 multivitamin to support wound


                                                 healing and skin integrity.


     Expected Outcomes/Goals


      Expected Outcomes/Goals                    1. PO intake to continue to


                                                 meet >75% of nutritional needs


                                                 .


                                                 2. Monitor PO intake, wt,


                                                 nutrition related labs, and


                                                 skin integrity to trend WNL.


                                                 3. F/U as moderate risk in 3-5


                                                 days, 10/6-10/8.

## 2019-10-12 NOTE — INTERNAL MEDICINE PROG NOTE
Internal Medicine Subjective





- Subjective


Service Date: 10/12/19


Patient seen and examined:: with staff


Patient is:: awake, asleep, arousable, garfield chair, agitated, confused


Patient Complaints of:: other (Anxiety.)


Per staff patient has:: no adverse event, no episodes of fall, agitated, 

confused





Internal Medicine Objective





- Physical Exam


Vitals and I&O: 


 Vital Signs











Temp  98.0 F   10/12/19 14:38


 


Pulse  64   10/12/19 14:38


 


Resp  20   10/12/19 14:38


 


BP  127/70   10/12/19 14:38


 


Pulse Ox  92   10/12/19 14:38








 Intake & Output











 10/11/19 10/12/19 10/12/19





 18:59 06:59 18:59


 


Intake Total 1100 240 


 


Balance 1100 240 


 


Intake:   


 


  Oral 1100 240 


 


Other:   


 


  # Voids 2 2 


 


  # Bowel Movements 1  











Active Medications: 


Current Medications





Acetaminophen (Tylenol)  650 mg PO Q4H PRN


   PRN Reason: Pain or Fever >101


   Stop: 12/10/19 01:50


Al Hydrox/Mg Hydrox/Simethicone (Maalox)  30 ml PO Q4HR PRN


   PRN Reason: GI DISTRESS


   Stop: 12/10/19 01:53


Aspirin (Aspirin)  325 mg PO DAILY [facility]


   Stop: 12/10/19 08:59


   Last Admin: 10/12/19 09:19 Dose:  325 mg


Atorvastatin Calcium (Lipitor)  80 mg PO DAILY [facility]; Protocol


   Stop: 12/10/19 08:59


   Last Admin: 10/12/19 09:19 Dose:  80 mg


Calamine/Phenol (Calmoseptine)  1 appl TP QID PRN


   PRN Reason: skin irritation, reddenned are


   Stop: 12/10/19 08:59


Clopidogrel Bisulfate (Plavix)  75 mg PO DAILY [facility]


   Stop: 12/10/19 08:59


   Last Admin: 10/12/19 09:19 Dose:  75 mg


Divalproex Sodium (Depakote Sprinkle)  500 mg PO HS [facility]ARY; Protocol


   Stop: 12/10/19 20:59


   Last Admin: 10/11/19 21:57 Dose:  500 mg


Divalproex Sodium (Depakote Sprinkle)  250 mg PO DAILY [facility]; Protocol


   Stop: 12/10/19 08:59


   Last Admin: 10/12/19 09:19 Dose:  250 mg


Docusate Sodium (Colace)  100 mg PO BID [facility]


   Stop: 12/10/19 08:59


   Last Admin: 10/12/19 09:19 Dose:  100 mg


Lorazepam (Ativan)  0.5 mg PO Q4HR PRN; Protocol


   PRN Reason: anxiety and agitation


   Stop: 12/10/19 02:10


Magnesium Hydroxide (Milk Of Magnesia)  30 ml PO HS PRN


   PRN Reason: Constipation


   Stop: 12/10/19 02:12


Quetiapine Fumarate (Seroquel)  50 mg PO BID [facility]; Protocol


   Stop: 12/10/19 08:59


   Last Admin: 10/12/19 09:20 Dose:  50 mg


Quetiapine Fumarate 100 mg/ (Quetiapine Fumarate 50 mg)  150 mg PO HS [facility]ARY


   Stop: 12/10/19 20:59


   Last Admin: 10/11/19 21:58 Dose:  150 mg


Zolpidem Tartrate (Ambien)  5 mg PO HS PRN


   PRN Reason: Insomnia


   Stop: 12/10/19 02:19


   Last Admin: 10/11/19 21:57 Dose:  5 mg








Physical Exam: 





Patient is awake, irritable, easily agitated and irritable.


General: weak, alert, disheveled


HEENT: NC/AT


Neck: Supple, No JVD


Lungs: other (no acute respiratory distress)


Cardiovascular: RRR


Abdomen: soft, non-tender, non-distended


Extremities: ecchymosis


Neurological: no change, unable to follow command (confused, demented)





Internal Medicine Assmt/Plan





- Assessment


Assessment: 


Insomnia.


Acute psychosis.


Dementia.


Schizoaffective disorder.


H/o CAD.


H/o Stroke.


H/o Hyperlipidemia.





- Plan


Plan: 





Continuation of care.


Monitor labs and vitals.


Monitor diet/nutritional support.


Psych management per Psych.


Continue present meds as directed.


Continue present care management.





Nutritional Asmnt/Malnutr-PDOC





- Dietary Evaluation


Malnutrition Findings (Please click <Entered> for more info): 








Nutritional Asmnt/Malnutrition                             Start:  10/03/19 15:

45


Text:                                                      Status: Complete    

  


Freq:                                                                          

  


Protocol:                                                                      

  


 Document     10/03/19 15:55  CARLNI  (Rec: 10/03/19 16:00  CARLIN [id number])


 Nutritional Asmnt/Malnutrition


     Patient General Information


      Nutritional Screening                      Low Risk


      Diagnosis                                  Psychosis


      Pertinent Medical Hx/Surgical Hx           TIA, CAD, Hyperlipidemia,


                                                 Schizoaffective Disorder,


                                                 Bipolar Type, Dementia


      Subjective Information                     Pt is a 69-year-old male


                                                 admitted on  on a 5150. Pt


                                                 is eating % of meals


                                                 Per Meal/Nutrition Activity


                                                 Record. Dietary is currently


                                                 providing an estimated 2370


                                                 kcals and 107gm Pro, per Pt


                                                 intake, this is providing an


                                                 estimated 2070 kcals and 94gm


                                                 Pro to meet 100% kcal and 100+


                                                 % Pro needs- adequate to meet


                                                 estimated nutritional needs.


                                                 Per wound care note (10/2), 


                                                 Right Lower Sacral and Buttock


                                                 area: Reopened scar tissue


                                                 from a wound of prior unknown


                                                 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


                                                 MASD. Open sites have 100% red


                                                 tissue. No odor, no drainage.


                                                 Yuli-wound intact.


                                                 Surrounding tissue has dark


                                                 discolored flaky skin and scar


                                                 tissue. Moisture barrier


                                                 cream nearly covered all of


                                                 the non-intact areas. Entire


                                                 site measures 13.0 cm x 15.0


                                                 cm (includes dark discolored


                                                 flaky skin, with a few


                                                 sporadic non-intact sites


                                                 within the dark discolored


                                                 flaky skin). Spoke with pt


                                                 Nurse, Colette, concerning wound


                                                 . Suggested either a


                                                 multivitamin or Prakash to


                                                 support wound healing, Colette


                                                 stated he would speak with MD


                                                 concerning a Multivitamin as


                                                 he did not believe the pt


                                                 would drink the Prakash.


                                                 HT: 57


                                                 WT: 155 LB (70.45 kg)


                                                 BMI: 24.28 (Normal)


                                                 GI: WNL, Soft, Non-tender


                                                 BM: 10/3 x1


                                                 I/O: 920/Not Noted


                                                 Skin: Area of Concern, ALIX Leg


                                                 abrasions with reddening, LT


                                                 hand bruise, Sacral/Buttock


                                                 open wound


                                                 Philip: 16


                                                 Diet Order: Pureed, ROBIN


                                                 Estimated Energy Needs: (


                                                 Geriatric, CBW)


                                                 7193-5847 kcals (25-30 kcals/


                                                 kg)


                                                 70-85g Pro (1.0-1.2 g/kg)


                                                 1602-7123 ml (25-30 ml/kg)


      Current Diet Order/ Nutrition Support      Pureed, ROBIN


      Pertinent Medications                      Maalox (PRN), Lipitor, Colace,


                                                 MOM (PRN)


      Pertinent Labs                             No Labs To Report.


     Nutritional Hx/Data


      Height                                     1.7 m


      Height (Calculated Centimeters)            170.2


      Current Weight (lbs)                       70.307 kg


      Weight (Calculated Kilograms)              70.3


      Weight (Calculated Grams)                  46360.8


      Ideal Body Weight                          148 LB(67.27 kg)


      % Ideal Body Weight                        105


      Body Mass Index (BMI)                      24.3


      Weight Status                              Approriate


     GI Symptoms


      GI Symptoms                                None


      Last BM                                    10/3 x1


      Skin Integrity/Comment:                    Skin: Area of Concern, ALIX Leg


                                                 abrasions with reddening, LT


                                                 hand bruise, Sacral/Buttock


                                                 open wound


                                                 Philip: 16


      Current %PO                                Good (%)


     Estimated Nutritional Goals


      BEE in Kcals:                              Using Current wt


      Calories/Kcals/Kg                          25-30


      Kcals Calculated                           2002-9747


      Protein:                                   Using Current wt


      Protein g/k.0-1.2


      Protein Calculated                         70-85


      Fluid: ml                                  1925-0350 ml (25-30 ml/kg)


     Nutritional Problem


      1. Problem


       Problem                                   Increased vitamin/mineral


                                                 utilization


       Etiology                                  r/t wound healing


       Signs/Symptoms:                           aeb ALIX Leg abrasions with


                                                 reddening, LT hand bruise,


                                                 Sacral/Buttock open wound.


     Intervention/Recommendation


      Comments                                   1. Continue with Pureed, ROBIN


                                                 diet as ordered.


                                                 2. Recommend addition of


                                                 multivitamin to support wound


                                                 healing and skin integrity.


     Expected Outcomes/Goals


      Expected Outcomes/Goals                    1. PO intake to continue to


                                                 meet >75% of nutritional needs


                                                 .


                                                 2. Monitor PO intake, wt,


                                                 nutrition related labs, and


                                                 skin integrity to trend WNL.


                                                 3. F/U as moderate risk in 3-5


                                                 days, 10/6-10/8.

## 2019-10-12 NOTE — PROGRESS NOTES
DATE:  10/11/2019



Case was discussed with staff of the patient, reviewed records.  This is a

69-year-old male who was admitted on 09/27/2019 because he was on a hold.  The

patient was selectively mute, disoriented, needing redirection, easily agitated.

 The patient is responding to internal stimuli, making hand gestures in regards

to the voices, easily irritable, agitated, needing a lot of redirection with his

TIA, coronary artery disease, hyperlipidemia with a history of dementia and

bipolar disorder.  The patient was sent yesterday to [facility] and he

was supposed to have been kept there; however, he is back, he was not eating or

drinking for a few days, but he was medically cleared.  The patient continues to

be irritable, angry, continues to have poor insight, unable to make safe plan

for self-care and internally preoccupied.  No side effects with the medication,

no sedation, no nausea, no extrapyramidal symptoms.  He is on Depakote 500 mg at

bedtime and 250 mg in the morning and Seroquel 150 mg at bedtime and 50 mg twice

a day.  No side effects with the medication, no sedation, no nausea, no

extrapyramidal symptoms.  We will continue outpatient group therapy, milieu

therapy, and adjust medications as needed.





DD: 10/11/2019 11:50

DT: 10/12/2019 00:29

JOB# [id number]  [id number]

## 2019-10-13 NOTE — PROGRESS NOTES
DATE:  10/12/2019



SUBJECTIVE:  The patient was seen and evaluated.  The patient's chart reviewed.



This is Dr. Carreno covering for Dr. Burch



Today on face-to-face evaluation, the patient is selectively mute.  He presents

easily irritable, difficult to engage in coherent conversation.



MENTAL STATUS EXAMINATION:  Easily irritable, anxious, disorganized thought

process.  Limited historian.



Current Medication reconciliation  reviewed.  Currently on Depakote, Seroquel 50

mg p.o. b.i.d. and 150 mg at nighttime.



ASSESSMENT AND PLAN:

1.  The patient who continues to be easily agitated, unpredictable, labile,

disorganized.  We will continue with the recent adjustments of the medication to

continue to reach steady state.





DD: 10/12/2019 12:36

DT: 10/13/2019 04:41

[facility]# [id number]  [id number]

## 2019-10-13 NOTE — INTERNAL MEDICINE PROG NOTE
Internal Medicine Subjective





- Subjective


Patient is:: awake, asleep, arousable, garfield chair, agitated, confused


Patient Complaints of:: other (Anxiety.)


Per staff patient has:: no adverse event, no episodes of fall, agitated, 

confused





Internal Medicine Objective





- Physical Exam


Vitals and I&O: 


 Vital Signs











Temp  96.6 F   10/13/19 15:25


 


Pulse  70   10/13/19 15:25


 


Resp  20   10/13/19 15:25


 


BP  101/55   10/13/19 15:25


 


Pulse Ox  95   10/13/19 15:25








 Intake & Output











 10/13/19 10/13/19 10/14/19





 06:59 18:59 06:59


 


Intake Total 120 950 


 


Output Total 1  


 


Balance 119 950 


 


Intake:   


 


  Oral 120 950 


 


Output:   


 


  Urine/Stool Mix 1  


 


Other:   


 


  # Voids 1 4 


 


  # Bowel Movements 1 1 











Active Medications: 


Current Medications





Acetaminophen (Tylenol)  650 mg PO Q4H PRN


   PRN Reason: Pain or Fever >101


   Stop: 12/10/19 01:50


Al Hydrox/Mg Hydrox/Simethicone (Maalox)  30 ml PO Q4HR PRN


   PRN Reason: GI DISTRESS


   Stop: 12/10/19 01:53


Aspirin (Aspirin)  325 mg PO DAILY [facility]


   Stop: 12/10/19 08:59


   Last Admin: 10/13/19 10:10 Dose:  325 mg


Atorvastatin Calcium (Lipitor)  80 mg PO DAILY [facility]; Protocol


   Stop: 12/10/19 08:59


   Last Admin: 10/13/19 10:10 Dose:  80 mg


Calamine/Phenol (Calmoseptine)  1 appl TP QID PRN


   PRN Reason: skin irritation, reddenned are


   Stop: 12/10/19 08:59


Clopidogrel Bisulfate (Plavix)  75 mg PO DAILY [facility]


   Stop: 12/10/19 08:59


   Last Admin: 10/13/19 10:10 Dose:  75 mg


Divalproex Sodium (Depakote Sprinkle)  500 mg PO HS [facility]; Protocol


   Stop: 12/10/19 20:59


   Last Admin: 10/12/19 21:23 Dose:  500 mg


Divalproex Sodium (Depakote Sprinkle)  250 mg PO DAILY [facility]; Protocol


   Stop: 12/10/19 08:59


   Last Admin: 10/13/19 10:09 Dose:  250 mg


Docusate Sodium (Colace)  100 mg PO BID [facility]


   Stop: 12/10/19 08:59


   Last Admin: 10/13/19 17:42 Dose:  100 mg


Lorazepam (Ativan)  0.5 mg PO Q4HR [facility]; Protocol


   PRN Reason: anxiety and agitation


   Stop: 12/10/19 02:10


Magnesium Hydroxide (Milk Of Magnesia)  30 ml PO HS PRN


   PRN Reason: Constipation


   Stop: 12/10/19 02:12


Quetiapine Fumarate (Seroquel)  50 mg PO BID [facility]; Protocol


   Stop: 12/10/19 08:59


   Last Admin: 10/13/19 17:42 Dose:  50 mg


Quetiapine Fumarate 100 mg/ (Quetiapine Fumarate 50 mg)  150 mg PO HS [facility]


   Stop: 12/10/19 20:59


   Last Admin: 10/12/19 21:23 Dose:  150 mg


Zolpidem Tartrate (Ambien)  5 mg PO HS PRN


   PRN Reason: Insomnia


   Stop: 12/10/19 02:19


   Last Admin: 10/11/19 21:57 Dose:  5 mg








General: weak, alert


HEENT: NC/AT


Neck: Supple, No JVD


Lungs: other (no acute respiratory distress)


Cardiovascular: RRR


Abdomen: soft, non-tender, non-distended


Extremities: ecchymosis


Neurological: no change, unable to follow command (confused, demented)





Internal Medicine Assmt/Plan





- Assessment


Assessment: 





Dementia


Schizoaffective disorder


Acute Psychosis


Insomnia


Hx CAD


Hx Stoke


Hyperlipidemia





- Plan


Plan: 





Continue current treatment plan.


Monitor Labs.Continue current medications


Continue to monitor VS


Monitor Diet/Nutritional support.


Psych management per Psychiatry.


Pain Management.


PT/OT prn


Safety precaution, Fall precaution, frequent nursing round.


Supportive care. 


Continue collaborating with consulting specialists, case management and nursing 

team.





Nutritional Asmnt/Malnutr-PDOC





- Dietary Evaluation


Malnutrition Findings (Please click <Entered> for more info): 








Nutritional Asmnt/Malnutrition                             Start:  10/03/19 15:

45


Text:                                                      Status: Complete    

  


Freq:                                                                          

  


Protocol:                                                                      

  


 Document     10/03/19 15:55  CARLIN  (Rec: 10/03/19 16:00  CARLIN [id number])


 Nutritional Asmnt/Malnutrition


     Patient General Information


      Nutritional Screening                      Low Risk


      Diagnosis                                  Psychosis


      Pertinent Medical Hx/Surgical Hx           TIA, CAD, Hyperlipidemia,


                                                 Schizoaffective Disorder,


                                                 Bipolar Type, Dementia


      Subjective Information                     Pt is a 69-year-old male


                                                 admitted on  on a 5150. Pt


                                                 is eating % of meals


                                                 Per Meal/Nutrition Activity


                                                 Record. Dietary is currently


                                                 providing an estimated 2370


                                                 kcals and 107gm Pro, per Pt


                                                 intake, this is providing an


                                                 estimated 2070 kcals and 94gm


                                                 Pro to meet 100% kcal and 100+


                                                 % Pro needs- adequate to meet


                                                 estimated nutritional needs.


                                                 Per wound care note (10/2), 


                                                 Right Lower Sacral and Buttock


                                                 area: Reopened scar tissue


                                                 from a wound of prior unknown


                                                 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


                                                 MASD. Open sites have 100% red


                                                 tissue. No odor, no drainage.


                                                 Yuli-wound intact.


                                                 Surrounding tissue has dark


                                                 discolored flaky skin and scar


                                                 tissue. Moisture barrier


                                                 cream nearly covered all of


                                                 the non-intact areas. Entire


                                                 site measures 13.0 cm x 15.0


                                                 cm (includes dark discolored


                                                 flaky skin, with a few


                                                 sporadic non-intact sites


                                                 within the dark discolored


                                                 flaky skin). Spoke with pt


                                                 Nurse, Colette, concerning wound


                                                 . Suggested either a


                                                 multivitamin or Prakash to


                                                 support wound healing, Colette


                                                 stated he would speak with MD


                                                 concerning a Multivitamin as


                                                 he did not believe the pt


                                                 would drink the Prakash.


                                                 HT: 57


                                                 WT: 155 LB (70.45 kg)


                                                 BMI: 24.28 (Normal)


                                                 GI: WNL, Soft, Non-tender


                                                 BM: 10/3 x1


                                                 I/O: 920/Not Noted


                                                 Skin: Area of Concern, ALIX Leg


                                                 abrasions with reddening, LT


                                                 hand bruise, Sacral/Buttock


                                                 open wound


                                                 Philip: 16


                                                 Diet Order: Pureed, ROBIN


                                                 Estimated Energy Needs: (


                                                 Geriatric, CBW)


                                                 2124-8722 kcals (25-30 kcals/


                                                 kg)


                                                 70-85g Pro (1.0-1.2 g/kg)


                                                 9036-6196 ml (25-30 ml/kg)


      Current Diet Order/ Nutrition Support      Pureed, ROBIN


      Pertinent Medications                      Maalox (PRN), Lipitor, Colace,


                                                 MOM (PRN)


      Pertinent Labs                             No Labs To Report.


     Nutritional Hx/Data


      Height                                     5 ft 7 in


      Height (Calculated Centimeters)            170.2


      Current Weight (lbs)                       155 lb


      Weight (Calculated Kilograms)              70.3


      Weight (Calculated Grams)                  69353.8


      Ideal Body Weight                          148 LB(67.27 kg)


      % Ideal Body Weight                        105


      Body Mass Index (BMI)                      24.3


      Weight Status                              Approriate


     GI Symptoms


      GI Symptoms                                None


      Last BM                                    10/3 x1


      Skin Integrity/Comment:                    Skin: Area of Concern, ALIX Leg


                                                 abrasions with reddening, LT


                                                 hand bruise, Sacral/Buttock


                                                 open wound


                                                 Philip: 16


      Current %PO                                Good (%)


     Estimated Nutritional Goals


      BEE in Kcals:                              Using Current wt


      Calories/Kcals/Kg                          25-30


      Kcals Calculated                           4950-1960


      Protein:                                   Using Current wt


      Protein g/k.0-1.2


      Protein Calculated                         70-85


      Fluid: ml                                  8739-6361 ml (25-30 ml/kg)


     Nutritional Problem


      1. Problem


       Problem                                   Increased vitamin/mineral


                                                 utilization


       Etiology                                  r/t wound healing


       Signs/Symptoms:                           aeb ALIX Leg abrasions with


                                                 reddening, LT hand bruise,


                                                 Sacral/Buttock open wound.


     Intervention/Recommendation


      Comments                                   1. Continue with Pureed, ROBIN


                                                 diet as ordered.


                                                 2. Recommend addition of


                                                 multivitamin to support wound


                                                 healing and skin integrity.


     Expected Outcomes/Goals


      Expected Outcomes/Goals                    1. PO intake to continue to


                                                 meet >75% of nutritional needs


                                                 .


                                                 2. Monitor PO intake, wt,


                                                 nutrition related labs, and


                                                 skin integrity to trend WNL.


                                                 3. F/U as moderate risk in 3-5


                                                 days, 10/6-10/8.

## 2019-10-13 NOTE — PROGRESS NOTES
DATE:  10/13/2019



SUBJECTIVE:  The patient was seen and evaluated.  The patient's chart reviewed.



Covering for Dr. Burch.



Today on face-to-face evaluation, irritable, derailed in conversation, difficult

to engage in a coherent conversation, does not provide much information.



MENTAL STATUS EXAMINATION:  Anxious, irritable, limited historian overall,

unable to assess thought process, thought content.



ASSESSMENT AND PLAN:  This behavior disturbance is secondary to dementia,

disorganized.  We will continue with the current medication adjustment as he

continues to reach steady state and continue working very closely with mental

 for safe disposition once psychiatrically stabilized, continue with

primary psychiatrist's treatment plan and goals.





DD: 10/13/2019 07:45

DT: 10/13/2019 21:42

JOB# [id number]  [id number]

## 2019-10-14 NOTE — INTERNAL MEDICINE PROG NOTE
Internal Medicine Subjective





- Subjective


Service Date: 10/14/19


Patient seen and examined:: with staff


Patient is:: awake, asleep, arousable, in bed, agitated, confused


Patient Complaints of:: other (Anxiety.)


Per staff patient has:: no adverse event, no episodes of fall, agitated, 

confused





Internal Medicine Objective





- Physical Exam


Vitals and I&O: 


 Vital Signs











Temp  98.1 F   10/14/19 06:46


 


Pulse  56   10/14/19 06:46


 


Resp  20   10/14/19 06:46


 


BP  109/65   10/14/19 06:46


 


Pulse Ox  98   10/14/19 06:46








 Intake & Output











 10/13/19 10/14/19 10/14/19





 18:59 06:59 18:59


 


Intake Total 950 240 


 


Balance 950 240 


 


Intake:   


 


  Oral 950 240 


 


Other:   


 


  # Voids 4 3 


 


  # Bowel Movements 1 1 











Active Medications: 


Current Medications





Acetaminophen (Tylenol)  650 mg PO Q4H PRN


   PRN Reason: Pain or Fever >101


   Stop: 12/10/19 01:50


Al Hydrox/Mg Hydrox/Simethicone (Maalox)  30 ml PO Q4HR PRN


   PRN Reason: GI DISTRESS


   Stop: 12/10/19 01:53


Aspirin (Aspirin)  325 mg PO DAILY [facility]


   Stop: 12/10/19 08:59


   Last Admin: 10/14/19 09:33 Dose:  325 mg


Atorvastatin Calcium (Lipitor)  80 mg PO DAILY [facility]; Protocol


   Stop: 12/10/19 08:59


   Last Admin: 10/14/19 09:33 Dose:  80 mg


Calamine/Phenol (Calmoseptine)  1 appl TP QID PRN


   PRN Reason: skin irritation, reddenned are


   Stop: 12/10/19 08:59


Clopidogrel Bisulfate (Plavix)  75 mg PO DAILY [facility]


   Stop: 12/10/19 08:59


   Last Admin: 10/14/19 09:33 Dose:  75 mg


Divalproex Sodium (Depakote Sprinkle)  500 mg PO HS [facility]; Protocol


   Stop: 12/10/19 20:59


   Last Admin: 10/13/19 21:43 Dose:  Not Given


Divalproex Sodium (Depakote Sprinkle)  250 mg PO DAILY [facility]; Protocol


   Stop: 12/10/19 08:59


   Last Admin: 10/14/19 09:33 Dose:  250 mg


Docusate Sodium (Colace)  100 mg PO BID [facility]


   Stop: 12/10/19 08:59


   Last Admin: 10/14/19 09:33 Dose:  100 mg


Lorazepam (Ativan)  0.5 mg PO Q4HR PRN; Protocol


   PRN Reason: anxiety and agitation


   Stop: 12/10/19 02:10


Magnesium Hydroxide (Milk Of Magnesia)  30 ml PO HS PRN


   PRN Reason: Constipation


   Stop: 12/10/19 02:12


Quetiapine Fumarate 100 mg/ (Quetiapine Fumarate 50 mg)  150 mg PO HS [facility]


   Stop: 12/10/19 20:59


   Last Admin: 10/13/19 21:43 Dose:  Not Given


Quetiapine Fumarate 50 mg/ (Quetiapine Fumarate 25 mg)  75 mg PO BID [facility]


   Stop: 19 08:59


   Last Admin: 10/14/19 09:33 Dose:  75 mg


Zolpidem Tartrate (Ambien)  5 mg PO HS PRN


   PRN Reason: Insomnia


   Stop: 12/10/19 02:19


   Last Admin: 10/11/19 21:57 Dose:  5 mg








Physical Exam: 


Patient is awake, remains very confused and agitated.


General: weak, alert


HEENT: NC/AT


Neck: Supple, No JVD


Lungs: other (no acute respiratory distress)


Cardiovascular: RRR


Abdomen: soft, non-tender, non-distended


Extremities: ecchymosis


Neurological: no change, unable to follow command (confused, demented)





Internal Medicine Assmt/Plan





- Assessment


Assessment: 


Insomnia.


Anxiety.


Acute psychosis.


Dementia.


Schizoaffective disorder.


H/o CAD.


H/o Stroke.


H/o Hyperlipidemia.





- Plan


Plan: 





Continuation of care.


Monitor labs and vitals.


Monitor diet/nutritional support.


Psych management per Psych.


Continue present meds as directed.


Continue present care management.





Nutritional Asmnt/Malnutr-PDOC





- Dietary Evaluation


Malnutrition Findings (Please click <Entered> for more info): 








Nutritional Asmnt/Malnutrition                             Start:  10/03/19 15:

45


Text:                                                      Status: Complete    

  


Freq:                                                                          

  


Protocol:                                                                      

  


 Document     10/03/19 15:55  CARLIN  (Rec: 10/03/19 16:00  CARLIN [id number])


 Nutritional Asmnt/Malnutrition


     Patient General Information


      Nutritional Screening                      Low Risk


      Diagnosis                                  Psychosis


      Pertinent Medical Hx/Surgical Hx           TIA, CAD, Hyperlipidemia,


                                                 Schizoaffective Disorder,


                                                 Bipolar Type, Dementia


      Subjective Information                     Pt is a 69-year-old male


                                                 admitted on  on a 5150. Pt


                                                 is eating % of meals


                                                 Per Meal/Nutrition Activity


                                                 Record. Dietary is currently


                                                 providing an estimated 2370


                                                 kcals and 107gm Pro, per Pt


                                                 intake, this is providing an


                                                 estimated 2070 kcals and 94gm


                                                 Pro to meet 100% kcal and 100+


                                                 % Pro needs- adequate to meet


                                                 estimated nutritional needs.


                                                 Per wound care note (10/2), 


                                                 Right Lower Sacral and Buttock


                                                 area: Reopened scar tissue


                                                 from a wound of prior unknown


                                                 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


                                                 MASD. Open sites have 100% red


                                                 tissue. No odor, no drainage.


                                                 Yuli-wound intact.


                                                 Surrounding tissue has dark


                                                 discolored flaky skin and scar


                                                 tissue. Moisture barrier


                                                 cream nearly covered all of


                                                 the non-intact areas. Entire


                                                 site measures 13.0 cm x 15.0


                                                 cm (includes dark discolored


                                                 flaky skin, with a few


                                                 sporadic non-intact sites


                                                 within the dark discolored


                                                 flaky skin). Spoke with pt


                                                 Nurse, Colette, concerning wound


                                                 . Suggested either a


                                                 multivitamin or Prakash to


                                                 support wound healing, Colette


                                                 stated he would speak with MD


                                                 concerning a Multivitamin as


                                                 he did not believe the pt


                                                 would drink the Prakash.


                                                 HT: 57


                                                 WT: 155 LB (70.45 kg)


                                                 BMI: 24.28 (Normal)


                                                 GI: WNL, Soft, Non-tender


                                                 BM: 10/3 x1


                                                 I/O: 920/Not Noted


                                                 Skin: Area of Concern, ALIX Leg


                                                 abrasions with reddening, LT


                                                 hand bruise, Sacral/Buttock


                                                 open wound


                                                 Philip: 16


                                                 Diet Order: Pureed, ROBIN


                                                 Estimated Energy Needs: (


                                                 Geriatric, CBW)


                                                 8302-7609 kcals (25-30 kcals/


                                                 kg)


                                                 70-85g Pro (1.0-1.2 g/kg)


                                                 8673-7883 ml (25-30 ml/kg)


      Current Diet Order/ Nutrition Support      Pureed, ROBIN


      Pertinent Medications                      Maalox (PRN), Lipitor, Colace,


                                                 MOM (PRN)


      Pertinent Labs                             No Labs To Report.


     Nutritional Hx/Data


      Height                                     1.7 m


      Height (Calculated Centimeters)            170.2


      Current Weight (lbs)                       70.307 kg


      Weight (Calculated Kilograms)              70.3


      Weight (Calculated Grams)                  28678.8


      Ideal Body Weight                          148 LB(67.27 kg)


      % Ideal Body Weight                        105


      Body Mass Index (BMI)                      24.3


      Weight Status                              Approriate


     GI Symptoms


      GI Symptoms                                None


      Last BM                                    10/3 x1


      Skin Integrity/Comment:                    Skin: Area of Concern, ALIX Leg


                                                 abrasions with reddening, LT


                                                 hand bruise, Sacral/Buttock


                                                 open wound


                                                 Philip: 16


      Current %PO                                Good (%)


     Estimated Nutritional Goals


      BEE in Kcals:                              Using Current wt


      Calories/Kcals/Kg                          25-30


      Kcals Calculated                           8222-4991


      Protein:                                   Using Current wt


      Protein g/k.0-1.2


      Protein Calculated                         70-85


      Fluid: ml                                  3419-7606 ml (25-30 ml/kg)


     Nutritional Problem


      1. Problem


       Problem                                   Increased vitamin/mineral


                                                 utilization


       Etiology                                  r/t wound healing


       Signs/Symptoms:                           aeb ALIX Leg abrasions with


                                                 reddening, LT hand bruise,


                                                 Sacral/Buttock open wound.


     Intervention/Recommendation


      Comments                                   1. Continue with Pureed, ROBIN


                                                 diet as ordered.


                                                 2. Recommend addition of


                                                 multivitamin to support wound


                                                 healing and skin integrity.


     Expected Outcomes/Goals


      Expected Outcomes/Goals                    1. PO intake to continue to


                                                 meet >75% of nutritional needs


                                                 .


                                                 2. Monitor PO intake, wt,


                                                 nutrition related labs, and


                                                 skin integrity to trend WNL.


                                                 3. F/U as moderate risk in 3-5


                                                 days, 10/6-10/8.

## 2019-10-14 NOTE — PROGRESS NOTES
DATE:  10/14/2019



SUBJECTIVE:  Chart reviewed and the patient interviewed.  Also discussed the

patient's condition with the staff and reviewed records and labs.  The patient

continued to be confused and restless and in irritable and angry mood.  The

patient also is still unable to provide any safe plan for self-care and cannot

remember when was the last time he ate or drank.  The patient also is still

selective in taking Depakote and he spits Depakote.  The patient also still

easily agitated and in angry mood.  Otherwise, the patient is compliant with

other medications.  He also still needs a lot of redirections and monitoring.



ASSESSMENT:  The patient is still confused and considered to be gravely

disabled.



TREATMENT PLAN:  We will place the patient on 30 days certificate.  Also, we

will increase Seroquel to 75 mg twice a day and 150 mg at bedtime.  Also, we

will change Depakote to Depakote liquid in a dose of 250 mg in the morning and

500 mg at bedtime.  Also, continue to work on his agitation and irritability and

continue to follow up closely.





DD: 10/14/2019 07:29

DT: 10/14/2019 19:10

JOB# [id number]  [id number]

## 2019-10-15 NOTE — INTERNAL MEDICINE PROG NOTE
Internal Medicine Subjective





- Subjective


Service Date: 10/15/19


Patient is:: awake, asleep, arousable, in bed, agitated, confused


Patient Complaints of:: other (Anxiety.)


Per staff patient has:: no adverse event, no episodes of fall, agitated, 

confused





Internal Medicine Objective





- Physical Exam


Vitals and I&O: 


 Vital Signs











Temp  98.2 F   10/15/19 13:58


 


Pulse  70   10/15/19 13:58


 


Resp  18   10/15/19 13:58


 


BP  134/64   10/15/19 13:58


 


Pulse Ox  99   10/15/19 13:58








 Intake & Output











 10/14/19 10/15/19 10/15/19





 18:59 06:59 18:59


 


Intake Total 800 360 


 


Balance 800 360 


 


Intake:   


 


  Oral 800 360 


 


Other:   


 


  # Voids 3 2 


 


  # Bowel Movements 0 0 











Active Medications: 


Current Medications





Acetaminophen (Tylenol)  650 mg PO Q4H PRN


   PRN Reason: Pain or Fever >101


   Stop: 12/10/19 01:50


Al Hydrox/Mg Hydrox/Simethicone (Maalox)  30 ml PO Q4HR PRN


   PRN Reason: GI DISTRESS


   Stop: 12/10/19 01:53


Aspirin (Aspirin)  325 mg PO DAILY [facility]


   Stop: 12/10/19 08:59


   Last Admin: 10/15/19 09:43 Dose:  325 mg


Atorvastatin Calcium (Lipitor)  80 mg PO DAILY [facility]; Protocol


   Stop: 12/10/19 08:59


   Last Admin: 10/15/19 09:43 Dose:  80 mg


Calamine/Phenol (Calmoseptine)  1 appl TP QID PRN


   PRN Reason: skin irritation, reddenned are


   Stop: 12/10/19 08:59


Clopidogrel Bisulfate (Plavix)  75 mg PO DAILY [facility]


   Stop: 12/10/19 08:59


   Last Admin: 10/15/19 09:43 Dose:  75 mg


Divalproex Sodium (Depakote Sprinkle)  500 mg PO HS [facility]; Protocol


   Stop: 12/10/19 20:59


   Last Admin: 10/14/19 20:58 Dose:  500 mg


Divalproex Sodium (Depakote Sprinkle)  250 mg PO DAILY [facility]; Protocol


   Stop: 12/10/19 08:59


   Last Admin: 10/15/19 09:43 Dose:  250 mg


Docusate Sodium (Colace)  100 mg PO BID [facility]


   Stop: 12/10/19 08:59


   Last Admin: 10/15/19 09:43 Dose:  100 mg


Lorazepam (Ativan)  0.5 mg PO Q4HR PRN; Protocol


   PRN Reason: anxiety and agitation


   Stop: 12/10/19 02:10


Magnesium Hydroxide (Milk Of Magnesia)  30 ml PO HS PRN


   PRN Reason: Constipation


   Stop: 12/10/19 02:12


Quetiapine Fumarate (Seroquel)  50 mg PO BID [facility]


   Stop: 19 08:59


   Last Admin: 10/15/19 09:43 Dose:  50 mg


Quetiapine Fumarate (Seroquel)  200 mg PO HS [facility]


   Stop: 19 20:59


Zolpidem Tartrate (Ambien)  5 mg PO HS PRN


   PRN Reason: Insomnia


   Stop: 12/10/19 02:19


   Last Admin: 10/14/19 20:59 Dose:  5 mg








General: weak, alert


HEENT: NC/AT


Neck: Supple, No JVD


Lungs: other (no acute respiratory distress)


Cardiovascular: RRR


Abdomen: soft, non-tender, non-distended


Extremities: ecchymosis


Neurological: no change, unable to follow command (confused, demented)





Internal Medicine Assmt/Plan





- Assessment


Assessment: 





Dementia.


Schizoaffective disorder.


H/o CAD.


H/o Stroke.


H/o Hyperlipidemia.








- Plan


Plan: 








Continuation of care.


Monitor labs and vitals.


Monitor diet/nutritional support.


Psych management per Psych.


Continue present meds as directed.


Continue current treatment plan as ordered.





Nutritional Asmnt/Malnutr-PDOC





- Dietary Evaluation


Malnutrition Findings (Please click <Entered> for more info): 








Nutritional Asmnt/Malnutrition                             Start:  10/03/19 15:

45


Text:                                                      Status: Complete    

  


Freq:                                                                          

  


Protocol:                                                                      

  


 Document     10/03/19 15:55  CARLIN  (Rec: 10/03/19 16:00  CARLIN [id number])


 Nutritional Asmnt/Malnutrition


     Patient General Information


      Nutritional Screening                      Low Risk


      Diagnosis                                  Psychosis


      Pertinent Medical Hx/Surgical Hx           TIA, CAD, Hyperlipidemia,


                                                 Schizoaffective Disorder,


                                                 Bipolar Type, Dementia


      Subjective Information                     Pt is a 69-year-old male


                                                 admitted on  on a 5150. Pt


                                                 is eating % of meals


                                                 Per Meal/Nutrition Activity


                                                 Record. Dietary is currently


                                                 providing an estimated 2370


                                                 kcals and 107gm Pro, per Pt


                                                 intake, this is providing an


                                                 estimated 2070 kcals and 94gm


                                                 Pro to meet 100% kcal and 100+


                                                 % Pro needs- adequate to meet


                                                 estimated nutritional needs.


                                                 Per wound care note (10/2), 


                                                 Right Lower Sacral and Buttock


                                                 area: Reopened scar tissue


                                                 from a wound of prior unknown


                                                 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


                                                 MASD. Open sites have 100% red


                                                 tissue. No odor, no drainage.


                                                 Yuli-wound intact.


                                                 Surrounding tissue has dark


                                                 discolored flaky skin and scar


                                                 tissue. Moisture barrier


                                                 cream nearly covered all of


                                                 the non-intact areas. Entire


                                                 site measures 13.0 cm x 15.0


                                                 cm (includes dark discolored


                                                 flaky skin, with a few


                                                 sporadic non-intact sites


                                                 within the dark discolored


                                                 flaky skin). Spoke with pt


                                                 Nurse, Colette, concerning wound


                                                 . Suggested either a


                                                 multivitamin or Prakash to


                                                 support wound healing, Colette


                                                 stated he would speak with MD


                                                 concerning a Multivitamin as


                                                 he did not believe the pt


                                                 would drink the Prakash.


                                                 HT: 57


                                                 WT: 155 LB (70.45 kg)


                                                 BMI: 24.28 (Normal)


                                                 GI: WNL, Soft, Non-tender


                                                 BM: 10/3 x1


                                                 I/O: 920/Not Noted


                                                 Skin: Area of Concern, ALIX Leg


                                                 abrasions with reddening, LT


                                                 hand bruise, Sacral/Buttock


                                                 open wound


                                                 Philip: 16


                                                 Diet Order: Pureed, ROBIN


                                                 Estimated Energy Needs: (


                                                 Geriatric, CBW)


                                                 9648-0896 kcals (25-30 kcals/


                                                 kg)


                                                 70-85g Pro (1.0-1.2 g/kg)


                                                 0458-5757 ml (25-30 ml/kg)


      Current Diet Order/ Nutrition Support      Pureed, ROBIN


      Pertinent Medications                      Maalox (PRN), Lipitor, Colace,


                                                 MOM (PRN)


      Pertinent Labs                             No Labs To Report.


     Nutritional Hx/Data


      Height                                     5 ft 7 in


      Height (Calculated Centimeters)            170.2


      Current Weight (lbs)                       155 lb


      Weight (Calculated Kilograms)              70.3


      Weight (Calculated Grams)                  66171.8


      Ideal Body Weight                          148 LB(67.27 kg)


      % Ideal Body Weight                        105


      Body Mass Index (BMI)                      24.3


      Weight Status                              Approriate


     GI Symptoms


      GI Symptoms                                None


      Last BM                                    10/3 x1


      Skin Integrity/Comment:                    Skin: Area of Concern, ALIX Leg


                                                 abrasions with reddening, LT


                                                 hand bruise, Sacral/Buttock


                                                 open wound


                                                 Philip: 16


      Current %PO                                Good (%)


     Estimated Nutritional Goals


      BEE in Kcals:                              Using Current wt


      Calories/Kcals/Kg                          25-30


      Kcals Calculated                           3544-0178


      Protein:                                   Using Current wt


      Protein g/k.0-1.2


      Protein Calculated                         70-85


      Fluid: ml                                  2163-8744 ml (25-30 ml/kg)


     Nutritional Problem


      1. Problem


       Problem                                   Increased vitamin/mineral


                                                 utilization


       Etiology                                  r/t wound healing


       Signs/Symptoms:                           aeb ALIX Leg abrasions with


                                                 reddening, LT hand bruise,


                                                 Sacral/Buttock open wound.


     Intervention/Recommendation


      Comments                                   1. Continue with Pureed, ROBIN


                                                 diet as ordered.


                                                 2. Recommend addition of


                                                 multivitamin to support wound


                                                 healing and skin integrity.


     Expected Outcomes/Goals


      Expected Outcomes/Goals                    1. PO intake to continue to


                                                 meet >75% of nutritional needs


                                                 .


                                                 2. Monitor PO intake, wt,


                                                 nutrition related labs, and


                                                 skin integrity to trend WNL.


                                                 3. F/U as moderate risk in 3-5


                                                 days, 10/6-10/8.

## 2019-10-15 NOTE — PROGRESS NOTES
DATE:  



Chart reviewed and the patient interviewed.  Also discussed the patient's

condition with the staff and reviewed records and labs.  The patient still have

difficulty sleeping at night and according to staff he only slept 2 hours last

night and he is resisting going to sleep.  The patient also is still irritable

and agitated and yells and screams at staff for no reason.  The patient also is

still in angry mood.  Otherwise, the patient is compliant with taking his

medications with no side effects of medications.



ASSESSMENT:  The patient is still agitated and still paranoid with difficulty

falling asleep.



TREATMENT PLAN:  We will continue monitoring his behavior and his condition

closely.  Also, we will change Seroquel to 50 mg twice a day and 200 mg at

bedtime.  Also waiting for Depakote blood level and the result is not back yet.





DD: 10/15/2019 06:47

DT: 10/15/2019 14:21

JOB# [id number]  [id number]

## 2019-10-16 NOTE — INTERNAL MEDICINE PROG NOTE
Internal Medicine Subjective





- Subjective


Service Date: 10/16/19


Patient seen and examined:: with staff


Patient is:: awake, asleep, arousable, in bed, agitated, confused


Patient Complaints of:: other (Anxiety.)


Per staff patient has:: no adverse event, no episodes of fall, agitated, 

confused





Internal Medicine Objective





- Physical Exam


Vitals and I&O: 


 Vital Signs











Temp  97.8 F   10/15/19 20:13


 


Pulse  78   10/15/19 20:13


 


Resp  18   10/16/19 08:00


 


BP  115/66   10/15/19 20:13


 


Pulse Ox  93   10/15/19 20:13








 Intake & Output











 10/15/19 10/16/19 10/16/19





 18:59 06:59 18:59


 


Intake Total  120 


 


Balance  120 


 


Intake:   


 


  Oral  120 


 


Other:   


 


  # Voids 4 1 


 


  # Bowel Movements 0 1 











Active Medications: 


Current Medications





Acetaminophen (Tylenol)  650 mg PO Q4H PRN


   PRN Reason: Pain or Fever >101


   Stop: 12/10/19 01:50


Al Hydrox/Mg Hydrox/Simethicone (Maalox)  30 ml PO Q4HR PRN


   PRN Reason: GI DISTRESS


   Stop: 12/10/19 01:53


Aspirin (Aspirin)  325 mg PO DAILY [facility]


   Stop: 12/10/19 08:59


   Last Admin: 10/16/19 08:29 Dose:  325 mg


Atorvastatin Calcium (Lipitor)  80 mg PO DAILY [facility]; Protocol


   Stop: 12/10/19 08:59


   Last Admin: 10/16/19 08:28 Dose:  80 mg


Calamine/Phenol (Calmoseptine)  1 appl TP QID PRN


   PRN Reason: skin irritation, reddenned are


   Stop: 12/10/19 08:59


Clopidogrel Bisulfate (Plavix)  75 mg PO DAILY [facility]


   Stop: 12/10/19 08:59


   Last Admin: 10/16/19 08:27 Dose:  75 mg


Divalproex Sodium (Depakote Sprinkle)  500 mg PO HS [facility]; Protocol


   Stop: 12/10/19 20:59


   Last Admin: 10/15/19 21:14 Dose:  500 mg


Divalproex Sodium (Depakote Sprinkle)  250 mg PO DAILY [facility]; Protocol


   Stop: 12/10/19 08:59


   Last Admin: 10/16/19 08:30 Dose:  250 mg


Docusate Sodium (Colace)  100 mg PO BID [facility]


   Stop: 12/10/19 08:59


   Last Admin: 10/16/19 08:28 Dose:  100 mg


Lorazepam (Ativan)  0.5 mg PO Q4HR PRN; Protocol


   PRN Reason: anxiety and agitation


   Stop: 12/10/19 02:10


   Last Admin: 10/15/19 21:15 Dose:  0.5 mg


Magnesium Hydroxide (Milk Of Magnesia)  30 ml PO HS PRN


   PRN Reason: Constipation


   Stop: 12/10/19 02:12


Quetiapine Fumarate (Seroquel)  50 mg PO BID [facility]


   Stop: 19 08:59


   Last Admin: 10/16/19 08:29 Dose:  50 mg


Quetiapine Fumarate (Seroquel)  200 mg PO HS [facility]


   Stop: 19 20:59


   Last Admin: 10/15/19 21:14 Dose:  200 mg


Zolpidem Tartrate (Ambien)  5 mg PO HS PRN


   PRN Reason: Insomnia


   Stop: 12/10/19 02:19


   Last Admin: 10/16/19 00:14 Dose:  5 mg








Physical Exam: 





Patient is awake, getting ready for discharge. Patient will continue present 

care management.


General: weak, alert


HEENT: NC/AT


Neck: Supple, No JVD


Lungs: other (no acute respiratory distress)


Cardiovascular: RRR


Abdomen: soft, non-tender, non-distended


Extremities: ecchymosis


Neurological: no change, unable to follow command (confused, demented)





Internal Medicine Assmt/Plan





- Assessment


Assessment: 





Dementia with behavioral disturbances.


Schizoaffective disorder.


History of Stroke.


History of Hyperlipidemia.   


History of TIA. 


History of CAD.








- Plan


Plan: 


Continuation of care.


Monitor vitals and labs.


Continue present meds as directed.


Monitor diet/nutritional support.


Supportive care.


Psych management per Psych.


Continue current treatment plan as ordered.








Nutritional Asmnt/Malnutr-PDOC





- Dietary Evaluation


Malnutrition Findings (Please click <Entered> for more info): 








Nutritional Asmnt/Malnutrition                             Start:  10/03/19 15:

45


Text:                                                      Status: Complete    

  


Freq:                                                                          

  


Protocol:                                                                      

  


 Document     10/03/19 15:55  CARLIN  (Rec: 10/03/19 16:00  CARLIN [id number])


 Nutritional Asmnt/Malnutrition


     Patient General Information


      Nutritional Screening                      Low Risk


      Diagnosis                                  Psychosis


      Pertinent Medical Hx/Surgical Hx           TIA, CAD, Hyperlipidemia,


                                                 Schizoaffective Disorder,


                                                 Bipolar Type, Dementia


      Subjective Information                     Pt is a 69-year-old male


                                                 admitted on  on a 5150. Pt


                                                 is eating % of meals


                                                 Per Meal/Nutrition Activity


                                                 Record. Dietary is currently


                                                 providing an estimated 2370


                                                 kcals and 107gm Pro, per Pt


                                                 intake, this is providing an


                                                 estimated 2070 kcals and 94gm


                                                 Pro to meet 100% kcal and 100+


                                                 % Pro needs- adequate to meet


                                                 estimated nutritional needs.


                                                 Per wound care note (10/2), 


                                                 Right Lower Sacral and Buttock


                                                 area: Reopened scar tissue


                                                 from a wound of prior unknown


                                                 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


                                                 MASD. Open sites have 100% red


                                                 tissue. No odor, no drainage.


                                                 Yuli-wound intact.


                                                 Surrounding tissue has dark


                                                 discolored flaky skin and scar


                                                 tissue. Moisture barrier


                                                 cream nearly covered all of


                                                 the non-intact areas. Entire


                                                 site measures 13.0 cm x 15.0


                                                 cm (includes dark discolored


                                                 flaky skin, with a few


                                                 sporadic non-intact sites


                                                 within the dark discolored


                                                 flaky skin). Spoke with pt


                                                 Nurse, Colette, concerning wound


                                                 . Suggested either a


                                                 multivitamin or Prakash to


                                                 support wound healing, Colette


                                                 stated he would speak with MD


                                                 concerning a Multivitamin as


                                                 he did not believe the pt


                                                 would drink the Prakash.


                                                 HT: 57


                                                 WT: 155 LB (70.45 kg)


                                                 BMI: 24.28 (Normal)


                                                 GI: WNL, Soft, Non-tender


                                                 BM: 10/3 x1


                                                 I/O: 920/Not Noted


                                                 Skin: Area of Concern, ALIX Leg


                                                 abrasions with reddening, LT


                                                 hand bruise, Sacral/Buttock


                                                 open wound


                                                 Philip: 16


                                                 Diet Order: Pureed, ROBIN


                                                 Estimated Energy Needs: (


                                                 Geriatric, CBW)


                                                 1850-6391 kcals (25-30 kcals/


                                                 kg)


                                                 70-85g Pro (1.0-1.2 g/kg)


                                                 3238-4824 ml (25-30 ml/kg)


      Current Diet Order/ Nutrition Support      Pureed, ROBIN


      Pertinent Medications                      Maalox (PRN), Lipitor, Colace,


                                                 MOM (PRN)


      Pertinent Labs                             No Labs To Report.


     Nutritional Hx/Data


      Height                                     1.7 m


      Height (Calculated Centimeters)            170.2


      Current Weight (lbs)                       70.307 kg


      Weight (Calculated Kilograms)              70.3


      Weight (Calculated Grams)                  04447.8


      Ideal Body Weight                          148 LB(67.27 kg)


      % Ideal Body Weight                        105


      Body Mass Index (BMI)                      24.3


      Weight Status                              Approriate


     GI Symptoms


      GI Symptoms                                None


      Last BM                                    10/3 x1


      Skin Integrity/Comment:                    Skin: Area of Concern, ALIX Leg


                                                 abrasions with reddening, LT


                                                 hand bruise, Sacral/Buttock


                                                 open wound


                                                 Philip: 16


      Current %PO                                Good (%)


     Estimated Nutritional Goals


      BEE in Kcals:                              Using Current wt


      Calories/Kcals/Kg                          25-30


      Kcals Calculated                           4533-6035


      Protein:                                   Using Current wt


      Protein g/k.0-1.2


      Protein Calculated                         70-85


      Fluid: ml                                  1171-8619 ml (25-30 ml/kg)


     Nutritional Problem


      1. Problem


       Problem                                   Increased vitamin/mineral


                                                 utilization


       Etiology                                  r/t wound healing


       Signs/Symptoms:                           aeb ALIX Leg abrasions with


                                                 reddening, LT hand bruise,


                                                 Sacral/Buttock open wound.


     Intervention/Recommendation


      Comments                                   1. Continue with Pureed, ROBIN


                                                 diet as ordered.


                                                 2. Recommend addition of


                                                 multivitamin to support wound


                                                 healing and skin integrity.


     Expected Outcomes/Goals


      Expected Outcomes/Goals                    1. PO intake to continue to


                                                 meet >75% of nutritional needs


                                                 .


                                                 2. Monitor PO intake, wt,


                                                 nutrition related labs, and


                                                 skin integrity to trend WNL.


                                                 3. F/U as moderate risk in 3-5


                                                 days, 10/6-10/8.

## 2019-10-16 NOTE — DISCHARGE SUMMARY
DATE OF DISCHARGE:  10/16/2019



AGE:  69.



SEX:  Male.



PHYSICIAN:  Dr. Burch.



FINAL DIAGNOSIS AND PRIMARY DIAGNOSIS:  Unspecified psychosis.



SECONDARY DIAGNOSES:  Dementia, moderate to severe, with psychotic features and

behavioral disturbances.



REASON FOR HOSPITALIZATION:  The patient was admitted to the hospital from

[facility] on a 5150 hold because of increased agitation and irritability

and aggressive behavior in the facility.



HOSPITAL COURSE:  The patient continued to be extremely irritable and agitated. 

The patient also was restless and he was hyperverbal and aggressive with the

staff.  The patient also was easily agitated and uncooperative with the staff. 

The patient also was resisting care and was not sleeping good at night.  Also,

was getting violent when staff was trying to help him with his ADLs.  The

patient was given Seroquel and the dose adjusted to 50 mg twice a day and 200 mg

at bedtime and also he was given Depakote and the dose adjusted to 250 mg in the

morning and 500 mg at bedtime.  Depakote blood level that was done on 10/01/2019

was 18.9.



The patient had difficulty sleeping and also at times was refusing to take

Depakote, but Depakote was changed to the sprinkler form and that helped with

his compliance.



PHYSICAL EXAMINATION:  Showed that the patient had no major medical problems

while in the hospital and Dr. Rogel monitored the patient's condition.



AFTER DISCHARGE PLANS:  The patient discharged from the hospital and returned to

[facility] with plans to follow him there.



EXPECTED OUTCOME AFTER DISCHARGE:  Fair if the patient continued to take his

medications and follow up with discharge plans.





DD: 10/16/2019 06:33

DT: 10/16/2019 12:48

[facility]# [id number]  [id number]